# Patient Record
Sex: FEMALE | Race: WHITE | Employment: FULL TIME
[De-identification: names, ages, dates, MRNs, and addresses within clinical notes are randomized per-mention and may not be internally consistent; named-entity substitution may affect disease eponyms.]

---

## 2017-11-11 ENCOUNTER — HEALTH MAINTENANCE LETTER (OUTPATIENT)
Age: 27
End: 2017-11-11

## 2019-09-29 ENCOUNTER — HEALTH MAINTENANCE LETTER (OUTPATIENT)
Age: 29
End: 2019-09-29

## 2021-01-14 ENCOUNTER — HEALTH MAINTENANCE LETTER (OUTPATIENT)
Age: 31
End: 2021-01-14

## 2021-06-02 ENCOUNTER — TRANSFERRED RECORDS (OUTPATIENT)
Dept: HEALTH INFORMATION MANAGEMENT | Facility: CLINIC | Age: 31
End: 2021-06-02

## 2021-06-02 LAB
ABO (EXTERNAL): NORMAL
HEP C HIM: NORMAL
HEPATITIS B SURFACE ANTIGEN (EXTERNAL): NEGATIVE
HIV 1&2 EXT: NORMAL
HIV1+2 AB SERPL QL IA: NONREACTIVE
RH (EXTERNAL): POSITIVE
RUBELLA ANTIBODY IGG (EXTERNAL): NORMAL
TREPONEMA PALLIDUM ANTIBODY (EXTERNAL): NONREACTIVE

## 2021-10-23 ENCOUNTER — HEALTH MAINTENANCE LETTER (OUTPATIENT)
Age: 31
End: 2021-10-23

## 2021-11-08 ENCOUNTER — HOSPITAL ENCOUNTER (OUTPATIENT)
Facility: CLINIC | Age: 31
End: 2021-11-08
Admitting: OBSTETRICS & GYNECOLOGY
Payer: COMMERCIAL

## 2021-12-29 ENCOUNTER — TRANSFERRED RECORDS (OUTPATIENT)
Dept: HEALTH INFORMATION MANAGEMENT | Facility: CLINIC | Age: 31
End: 2021-12-29
Payer: COMMERCIAL

## 2022-01-12 ENCOUNTER — HOSPITAL ENCOUNTER (OUTPATIENT)
Facility: CLINIC | Age: 32
Discharge: HOME | End: 2022-01-12
Attending: OBSTETRICS & GYNECOLOGY | Admitting: OBSTETRICS & GYNECOLOGY
Payer: COMMERCIAL

## 2022-01-12 VITALS — TEMPERATURE: 98.8 F | SYSTOLIC BLOOD PRESSURE: 120 MMHG | DIASTOLIC BLOOD PRESSURE: 70 MMHG

## 2022-01-12 LAB — RUPTURE OF FETAL MEMBRANES BY ROM PLUS: NEGATIVE

## 2022-01-12 PROCEDURE — 84112 EVAL AMNIOTIC FLUID PROTEIN: CPT | Performed by: OBSTETRICS & GYNECOLOGY

## 2022-01-12 PROCEDURE — G0463 HOSPITAL OUTPT CLINIC VISIT: HCPCS | Mod: 25

## 2022-01-12 PROCEDURE — 59025 FETAL NON-STRESS TEST: CPT | Mod: 76

## 2022-01-12 PROCEDURE — G0463 HOSPITAL OUTPT CLINIC VISIT: HCPCS

## 2022-01-12 RX ORDER — PROCHLORPERAZINE 25 MG
25 SUPPOSITORY, RECTAL RECTAL EVERY 12 HOURS PRN
Status: DISCONTINUED | OUTPATIENT
Start: 2022-01-12 | End: 2022-01-12 | Stop reason: HOSPADM

## 2022-01-12 RX ORDER — ONDANSETRON 4 MG/1
4 TABLET, ORALLY DISINTEGRATING ORAL EVERY 6 HOURS PRN
Status: DISCONTINUED | OUTPATIENT
Start: 2022-01-12 | End: 2022-01-12 | Stop reason: HOSPADM

## 2022-01-12 RX ORDER — METOCLOPRAMIDE HYDROCHLORIDE 5 MG/ML
10 INJECTION INTRAMUSCULAR; INTRAVENOUS EVERY 6 HOURS PRN
Status: DISCONTINUED | OUTPATIENT
Start: 2022-01-12 | End: 2022-01-12 | Stop reason: HOSPADM

## 2022-01-12 RX ORDER — ONDANSETRON 2 MG/ML
4 INJECTION INTRAMUSCULAR; INTRAVENOUS EVERY 6 HOURS PRN
Status: DISCONTINUED | OUTPATIENT
Start: 2022-01-12 | End: 2022-01-12 | Stop reason: HOSPADM

## 2022-01-12 RX ORDER — METOCLOPRAMIDE 10 MG/1
10 TABLET ORAL EVERY 6 HOURS PRN
Status: DISCONTINUED | OUTPATIENT
Start: 2022-01-12 | End: 2022-01-12 | Stop reason: HOSPADM

## 2022-01-12 RX ORDER — PROCHLORPERAZINE MALEATE 5 MG
10 TABLET ORAL EVERY 6 HOURS PRN
Status: DISCONTINUED | OUTPATIENT
Start: 2022-01-12 | End: 2022-01-12 | Stop reason: HOSPADM

## 2022-01-12 NOTE — DISCHARGE INSTRUCTIONS
Data: [facility] at 1600.   Reason for maternal/fetal assessment per patient is No chief complaint on file.  . Patient is a . Prenatal record reviewed.      Gestational Age 39w2d. VSS. Cervix: posterior.  Fetal movement present. Patient denies cramping, backache, vaginal discharge, pelvic pressure, UTI symptoms, GI problems, bloody show, vaginal bleeding, edema, headache, visual disturbances, epigastric or URQ pain, abdominal pain, rupture of membranes. Support persons  present.  Action: Verbal consent for EFM. Triage assessment completed. EFM applied for reassuring strip. Uterine assessment no ctx-negative ROM+. Fetal assessment: Presumed adequate fetal oxygenation documented (see flow record). Patient education pamphlets given on fetal movement counts . Patient instructed to report change in fetal movement, vaginal leaking of fluid or bleeding, abdominal pain, or any concerns related to the pregnancy to her nurse/physician.   Response: Dr. Dent informed of NEG ROM+. Plan per provider is keep scheduled induction on 22. Patient verbalized understanding of education and verbalized agreement with plan. Discharged ambulatory at 1735.

## 2022-01-17 ENCOUNTER — TRANSFERRED RECORDS (OUTPATIENT)
Dept: HEALTH INFORMATION MANAGEMENT | Facility: CLINIC | Age: 32
End: 2022-01-17
Payer: COMMERCIAL

## 2022-01-22 ENCOUNTER — HOSPITAL ENCOUNTER (INPATIENT)
Facility: CLINIC | Age: 32
LOS: 2 days | Discharge: HOME | End: 2022-01-24
Attending: OBSTETRICS & GYNECOLOGY | Admitting: OBSTETRICS & GYNECOLOGY
Payer: COMMERCIAL

## 2022-01-22 ENCOUNTER — ANESTHESIA EVENT (OUTPATIENT)
Dept: OBGYN | Facility: CLINIC | Age: 32
End: 2022-01-22
Payer: COMMERCIAL

## 2022-01-22 ENCOUNTER — ANESTHESIA (OUTPATIENT)
Dept: OBGYN | Facility: CLINIC | Age: 32
End: 2022-01-22
Payer: COMMERCIAL

## 2022-01-22 DIAGNOSIS — Z37.9 VACUUM-ASSISTED VAGINAL DELIVERY: ICD-10-CM

## 2022-01-22 PROBLEM — Z36.89 ENCOUNTER FOR TRIAGE IN PREGNANT PATIENT: Status: ACTIVE | Noted: 2022-01-22

## 2022-01-22 LAB
ABO/RH(D): NORMAL
ANTIBODY SCREEN: NEGATIVE
RUPTURE OF FETAL MEMBRANES BY ROM PLUS: NEGATIVE
SARS-COV-2 RNA RESP QL NAA+PROBE: NEGATIVE
SPECIMEN EXPIRATION DATE: NORMAL
T PALLIDUM AB SER QL: NONREACTIVE

## 2022-01-22 PROCEDURE — 250N000011 HC RX IP 250 OP 636: Performed by: ANESTHESIOLOGY

## 2022-01-22 PROCEDURE — 258N000003 HC RX IP 258 OP 636: Performed by: OBSTETRICS & GYNECOLOGY

## 2022-01-22 PROCEDURE — 0DQR0ZZ REPAIR ANAL SPHINCTER, OPEN APPROACH: ICD-10-PCS | Performed by: OBSTETRICS & GYNECOLOGY

## 2022-01-22 PROCEDURE — 722N000001 HC LABOR CARE VAGINAL DELIVERY SINGLE

## 2022-01-22 PROCEDURE — 250N000009 HC RX 250: Performed by: ANESTHESIOLOGY

## 2022-01-22 PROCEDURE — 86901 BLOOD TYPING SEROLOGIC RH(D): CPT | Performed by: OBSTETRICS & GYNECOLOGY

## 2022-01-22 PROCEDURE — 00HU33Z INSERTION OF INFUSION DEVICE INTO SPINAL CANAL, PERCUTANEOUS APPROACH: ICD-10-PCS | Performed by: ANESTHESIOLOGY

## 2022-01-22 PROCEDURE — 258N000003 HC RX IP 258 OP 636: Performed by: ANESTHESIOLOGY

## 2022-01-22 PROCEDURE — 86780 TREPONEMA PALLIDUM: CPT | Performed by: OBSTETRICS & GYNECOLOGY

## 2022-01-22 PROCEDURE — 250N000013 HC RX MED GY IP 250 OP 250 PS 637: Performed by: OBSTETRICS & GYNECOLOGY

## 2022-01-22 PROCEDURE — 250N000011 HC RX IP 250 OP 636: Performed by: OBSTETRICS & GYNECOLOGY

## 2022-01-22 PROCEDURE — 84112 EVAL AMNIOTIC FLUID PROTEIN: CPT | Performed by: OBSTETRICS & GYNECOLOGY

## 2022-01-22 PROCEDURE — 87635 SARS-COV-2 COVID-19 AMP PRB: CPT | Performed by: OBSTETRICS & GYNECOLOGY

## 2022-01-22 PROCEDURE — G0463 HOSPITAL OUTPT CLINIC VISIT: HCPCS | Mod: 25

## 2022-01-22 PROCEDURE — 370N000003 HC ANESTHESIA WARD SERVICE

## 2022-01-22 PROCEDURE — 3E0R3BZ INTRODUCTION OF ANESTHETIC AGENT INTO SPINAL CANAL, PERCUTANEOUS APPROACH: ICD-10-PCS | Performed by: ANESTHESIOLOGY

## 2022-01-22 PROCEDURE — 120N000012 HC R&B POSTPARTUM

## 2022-01-22 PROCEDURE — 59025 FETAL NON-STRESS TEST: CPT

## 2022-01-22 PROCEDURE — 250N000009 HC RX 250: Performed by: OBSTETRICS & GYNECOLOGY

## 2022-01-22 RX ORDER — IBUPROFEN 400 MG/1
800 TABLET, FILM COATED ORAL EVERY 6 HOURS PRN
Status: DISCONTINUED | OUTPATIENT
Start: 2022-01-22 | End: 2022-01-24 | Stop reason: HOSPADM

## 2022-01-22 RX ORDER — CARBOPROST TROMETHAMINE 250 UG/ML
250 INJECTION, SOLUTION INTRAMUSCULAR
Status: DISCONTINUED | OUTPATIENT
Start: 2022-01-22 | End: 2022-01-22 | Stop reason: HOSPADM

## 2022-01-22 RX ORDER — NALOXONE HYDROCHLORIDE 0.4 MG/ML
0.4 INJECTION, SOLUTION INTRAMUSCULAR; INTRAVENOUS; SUBCUTANEOUS
Status: DISCONTINUED | OUTPATIENT
Start: 2022-01-22 | End: 2022-01-22 | Stop reason: HOSPADM

## 2022-01-22 RX ORDER — ONDANSETRON 4 MG/1
4 TABLET, ORALLY DISINTEGRATING ORAL EVERY 6 HOURS PRN
Status: DISCONTINUED | OUTPATIENT
Start: 2022-01-22 | End: 2022-01-22 | Stop reason: HOSPADM

## 2022-01-22 RX ORDER — PROCHLORPERAZINE MALEATE 5 MG
10 TABLET ORAL EVERY 6 HOURS PRN
Status: DISCONTINUED | OUTPATIENT
Start: 2022-01-22 | End: 2022-01-22 | Stop reason: HOSPADM

## 2022-01-22 RX ORDER — ROPIVACAINE HYDROCHLORIDE 2 MG/ML
10 INJECTION, SOLUTION EPIDURAL; INFILTRATION; PERINEURAL ONCE
Status: DISCONTINUED | OUTPATIENT
Start: 2022-01-22 | End: 2022-01-24 | Stop reason: HOSPADM

## 2022-01-22 RX ORDER — ONDANSETRON 2 MG/ML
4 INJECTION INTRAMUSCULAR; INTRAVENOUS EVERY 6 HOURS PRN
Status: DISCONTINUED | OUTPATIENT
Start: 2022-01-22 | End: 2022-01-22 | Stop reason: HOSPADM

## 2022-01-22 RX ORDER — NALOXONE HYDROCHLORIDE 0.4 MG/ML
0.2 INJECTION, SOLUTION INTRAMUSCULAR; INTRAVENOUS; SUBCUTANEOUS
Status: DISCONTINUED | OUTPATIENT
Start: 2022-01-22 | End: 2022-01-24 | Stop reason: HOSPADM

## 2022-01-22 RX ORDER — LIDOCAINE 40 MG/G
CREAM TOPICAL
Status: DISCONTINUED | OUTPATIENT
Start: 2022-01-22 | End: 2022-01-22 | Stop reason: HOSPADM

## 2022-01-22 RX ORDER — MODIFIED LANOLIN
OINTMENT (GRAM) TOPICAL
Status: DISCONTINUED | OUTPATIENT
Start: 2022-01-22 | End: 2022-01-24 | Stop reason: HOSPADM

## 2022-01-22 RX ORDER — OXYTOCIN/0.9 % SODIUM CHLORIDE 30/500 ML
340 PLASTIC BAG, INJECTION (ML) INTRAVENOUS CONTINUOUS PRN
Status: DISCONTINUED | OUTPATIENT
Start: 2022-01-22 | End: 2022-01-22 | Stop reason: HOSPADM

## 2022-01-22 RX ORDER — ROPIVACAINE HYDROCHLORIDE 2 MG/ML
INJECTION, SOLUTION EPIDURAL; INFILTRATION; PERINEURAL
Status: COMPLETED | OUTPATIENT
Start: 2022-01-22 | End: 2022-01-22

## 2022-01-22 RX ORDER — MISOPROSTOL 200 UG/1
800 TABLET ORAL
Status: DISCONTINUED | OUTPATIENT
Start: 2022-01-22 | End: 2022-01-24 | Stop reason: HOSPADM

## 2022-01-22 RX ORDER — NALOXONE HYDROCHLORIDE 0.4 MG/ML
0.2 INJECTION, SOLUTION INTRAMUSCULAR; INTRAVENOUS; SUBCUTANEOUS
Status: DISCONTINUED | OUTPATIENT
Start: 2022-01-22 | End: 2022-01-22 | Stop reason: HOSPADM

## 2022-01-22 RX ORDER — ACETAMINOPHEN 325 MG/1
650 TABLET ORAL EVERY 4 HOURS PRN
Status: DISCONTINUED | OUTPATIENT
Start: 2022-01-22 | End: 2022-01-24 | Stop reason: HOSPADM

## 2022-01-22 RX ORDER — TRANEXAMIC ACID 10 MG/ML
1 INJECTION, SOLUTION INTRAVENOUS EVERY 30 MIN PRN
Status: DISCONTINUED | OUTPATIENT
Start: 2022-01-22 | End: 2022-01-24 | Stop reason: HOSPADM

## 2022-01-22 RX ORDER — OXYTOCIN 10 [USP'U]/ML
10 INJECTION, SOLUTION INTRAMUSCULAR; INTRAVENOUS
Status: DISCONTINUED | OUTPATIENT
Start: 2022-01-22 | End: 2022-01-22 | Stop reason: HOSPADM

## 2022-01-22 RX ORDER — MISOPROSTOL 200 UG/1
400 TABLET ORAL
Status: DISCONTINUED | OUTPATIENT
Start: 2022-01-22 | End: 2022-01-22 | Stop reason: HOSPADM

## 2022-01-22 RX ORDER — VITAMIN A ACETATE, .BETA.-CAROTENE, ASCORBIC ACID, CHOLECALCIFEROL, .ALPHA.-TOCOPHEROL ACETATE, DL-, THIAMINE MONONITRATE, RIBOFLAVIN, NIACINAMIDE, PYRIDOXINE HYDROCHLORIDE, FOLIC ACID, CYANOCOBALAMIN, CALCIUM CARBONATE, FERROUS FUMARATE, ZINC OXIDE, AND CUPRIC OXIDE 2000; 2000; 120; 400; 22; 1.84; 3; 20; 10; 1; 12; 200; 27; 25; 2 [IU]/1; [IU]/1; MG/1; [IU]/1; MG/1; MG/1; MG/1; MG/1; MG/1; MG/1; UG/1; MG/1; MG/1; MG/1; MG/1
1 TABLET ORAL DAILY
COMMUNITY

## 2022-01-22 RX ORDER — NALOXONE HYDROCHLORIDE 0.4 MG/ML
0.4 INJECTION, SOLUTION INTRAMUSCULAR; INTRAVENOUS; SUBCUTANEOUS
Status: DISCONTINUED | OUTPATIENT
Start: 2022-01-22 | End: 2022-01-24 | Stop reason: HOSPADM

## 2022-01-22 RX ORDER — METHYLERGONOVINE MALEATE 0.2 MG/ML
200 INJECTION INTRAVENOUS
Status: DISCONTINUED | OUTPATIENT
Start: 2022-01-22 | End: 2022-01-24 | Stop reason: HOSPADM

## 2022-01-22 RX ORDER — FENTANYL CITRATE 50 UG/ML
50-100 INJECTION, SOLUTION INTRAMUSCULAR; INTRAVENOUS
Status: DISCONTINUED | OUTPATIENT
Start: 2022-01-22 | End: 2022-01-22 | Stop reason: HOSPADM

## 2022-01-22 RX ORDER — EPHEDRINE SULFATE 50 MG/ML
5 INJECTION, SOLUTION INTRAMUSCULAR; INTRAVENOUS; SUBCUTANEOUS
Status: DISCONTINUED | OUTPATIENT
Start: 2022-01-22 | End: 2022-01-24 | Stop reason: HOSPADM

## 2022-01-22 RX ORDER — MISOPROSTOL 200 UG/1
400 TABLET ORAL
Status: DISCONTINUED | OUTPATIENT
Start: 2022-01-22 | End: 2022-01-24 | Stop reason: HOSPADM

## 2022-01-22 RX ORDER — TRANEXAMIC ACID 10 MG/ML
1 INJECTION, SOLUTION INTRAVENOUS EVERY 30 MIN PRN
Status: DISCONTINUED | OUTPATIENT
Start: 2022-01-22 | End: 2022-01-22 | Stop reason: HOSPADM

## 2022-01-22 RX ORDER — SODIUM CHLORIDE, SODIUM LACTATE, POTASSIUM CHLORIDE, CALCIUM CHLORIDE 600; 310; 30; 20 MG/100ML; MG/100ML; MG/100ML; MG/100ML
INJECTION, SOLUTION INTRAVENOUS CONTINUOUS
Status: DISCONTINUED | OUTPATIENT
Start: 2022-01-22 | End: 2022-01-22 | Stop reason: HOSPADM

## 2022-01-22 RX ORDER — METOCLOPRAMIDE 10 MG/1
10 TABLET ORAL EVERY 6 HOURS PRN
Status: DISCONTINUED | OUTPATIENT
Start: 2022-01-22 | End: 2022-01-22 | Stop reason: HOSPADM

## 2022-01-22 RX ORDER — MISOPROSTOL 200 UG/1
800 TABLET ORAL
Status: DISCONTINUED | OUTPATIENT
Start: 2022-01-22 | End: 2022-01-22 | Stop reason: HOSPADM

## 2022-01-22 RX ORDER — OXYTOCIN/0.9 % SODIUM CHLORIDE 30/500 ML
100-340 PLASTIC BAG, INJECTION (ML) INTRAVENOUS CONTINUOUS PRN
Status: DISCONTINUED | OUTPATIENT
Start: 2022-01-22 | End: 2022-01-22

## 2022-01-22 RX ORDER — KETOROLAC TROMETHAMINE 30 MG/ML
30 INJECTION, SOLUTION INTRAMUSCULAR; INTRAVENOUS
Status: DISCONTINUED | OUTPATIENT
Start: 2022-01-22 | End: 2022-01-22

## 2022-01-22 RX ORDER — NALBUPHINE HYDROCHLORIDE 10 MG/ML
2.5-5 INJECTION, SOLUTION INTRAMUSCULAR; INTRAVENOUS; SUBCUTANEOUS EVERY 6 HOURS PRN
Status: DISCONTINUED | OUTPATIENT
Start: 2022-01-22 | End: 2022-01-24 | Stop reason: HOSPADM

## 2022-01-22 RX ORDER — METOCLOPRAMIDE HYDROCHLORIDE 5 MG/ML
10 INJECTION INTRAMUSCULAR; INTRAVENOUS EVERY 6 HOURS PRN
Status: DISCONTINUED | OUTPATIENT
Start: 2022-01-22 | End: 2022-01-22 | Stop reason: HOSPADM

## 2022-01-22 RX ORDER — CARBOPROST TROMETHAMINE 250 UG/ML
250 INJECTION, SOLUTION INTRAMUSCULAR
Status: DISCONTINUED | OUTPATIENT
Start: 2022-01-22 | End: 2022-01-24 | Stop reason: HOSPADM

## 2022-01-22 RX ORDER — IBUPROFEN 600 MG/1
600 TABLET, FILM COATED ORAL
Status: DISCONTINUED | OUTPATIENT
Start: 2022-01-22 | End: 2022-01-22

## 2022-01-22 RX ORDER — DOCUSATE SODIUM 100 MG/1
100 CAPSULE, LIQUID FILLED ORAL 2 TIMES DAILY
Status: DISCONTINUED | OUTPATIENT
Start: 2022-01-22 | End: 2022-01-24 | Stop reason: HOSPADM

## 2022-01-22 RX ORDER — OXYTOCIN 10 [USP'U]/ML
10 INJECTION, SOLUTION INTRAMUSCULAR; INTRAVENOUS
Status: DISCONTINUED | OUTPATIENT
Start: 2022-01-22 | End: 2022-01-22

## 2022-01-22 RX ORDER — OXYTOCIN 10 [USP'U]/ML
10 INJECTION, SOLUTION INTRAMUSCULAR; INTRAVENOUS
Status: DISCONTINUED | OUTPATIENT
Start: 2022-01-22 | End: 2022-01-24 | Stop reason: HOSPADM

## 2022-01-22 RX ORDER — OXYTOCIN/0.9 % SODIUM CHLORIDE 30/500 ML
340 PLASTIC BAG, INJECTION (ML) INTRAVENOUS CONTINUOUS PRN
Status: DISCONTINUED | OUTPATIENT
Start: 2022-01-22 | End: 2022-01-24 | Stop reason: HOSPADM

## 2022-01-22 RX ORDER — METHYLERGONOVINE MALEATE 0.2 MG/ML
200 INJECTION INTRAVENOUS
Status: DISCONTINUED | OUTPATIENT
Start: 2022-01-22 | End: 2022-01-22 | Stop reason: HOSPADM

## 2022-01-22 RX ORDER — HYDROCORTISONE 2.5 %
CREAM (GRAM) TOPICAL 3 TIMES DAILY PRN
Status: DISCONTINUED | OUTPATIENT
Start: 2022-01-22 | End: 2022-01-24 | Stop reason: HOSPADM

## 2022-01-22 RX ORDER — PROCHLORPERAZINE 25 MG
25 SUPPOSITORY, RECTAL RECTAL EVERY 12 HOURS PRN
Status: DISCONTINUED | OUTPATIENT
Start: 2022-01-22 | End: 2022-01-22 | Stop reason: HOSPADM

## 2022-01-22 RX ADMIN — Medication 5 MG: at 05:24

## 2022-01-22 RX ADMIN — DOCUSATE SODIUM 100 MG: 100 CAPSULE, LIQUID FILLED ORAL at 20:42

## 2022-01-22 RX ADMIN — Medication: at 05:11

## 2022-01-22 RX ADMIN — SODIUM CHLORIDE, POTASSIUM CHLORIDE, SODIUM LACTATE AND CALCIUM CHLORIDE 1000 ML: 600; 310; 30; 20 INJECTION, SOLUTION INTRAVENOUS at 04:24

## 2022-01-22 RX ADMIN — ROPIVACAINE HYDROCHLORIDE 10 ML: 2 INJECTION, SOLUTION EPIDURAL; INFILTRATION at 05:00

## 2022-01-22 RX ADMIN — ACETAMINOPHEN 650 MG: 325 TABLET, FILM COATED ORAL at 20:41

## 2022-01-22 RX ADMIN — Medication: at 05:06

## 2022-01-22 RX ADMIN — ACETAMINOPHEN 650 MG: 325 TABLET, FILM COATED ORAL at 14:28

## 2022-01-22 RX ADMIN — Medication 5 MG: at 05:28

## 2022-01-22 RX ADMIN — METHYLERGONOVINE MALEATE 200 MCG: 0.2 INJECTION INTRAVENOUS at 13:30

## 2022-01-22 RX ADMIN — SODIUM CHLORIDE, POTASSIUM CHLORIDE, SODIUM LACTATE AND CALCIUM CHLORIDE 500 ML: 600; 310; 30; 20 INJECTION, SOLUTION INTRAVENOUS at 08:05

## 2022-01-22 RX ADMIN — Medication 340 ML/HR: at 13:11

## 2022-01-22 RX ADMIN — IBUPROFEN 800 MG: 400 TABLET ORAL at 20:42

## 2022-01-22 RX ADMIN — SODIUM CHLORIDE, POTASSIUM CHLORIDE, SODIUM LACTATE AND CALCIUM CHLORIDE: 600; 310; 30; 20 INJECTION, SOLUTION INTRAVENOUS at 05:02

## 2022-01-22 RX ADMIN — Medication 5 MG: at 05:19

## 2022-01-22 RX ADMIN — IBUPROFEN 800 MG: 400 TABLET ORAL at 14:28

## 2022-01-22 ASSESSMENT — ACTIVITIES OF DAILY LIVING (ADL)
ADLS_ACUITY_SCORE: 3

## 2022-01-22 ASSESSMENT — MIFFLIN-ST. JEOR: SCORE: 1314.17

## 2022-01-22 NOTE — PROGRESS NOTES
Ernestina SHANKAR Stephanie arrived via ambulatory at 0330 and was admitted to 1218 for labor. Oriented to room, placed on monitor, call light within reach and instructed on use. Dr Sullivan notified of patient arrival and condition.

## 2022-01-22 NOTE — PROVIDER NOTIFICATION
Dr Vu called unit to check on FHR. Updated on moderate variability with accels. No new orders received. Will updated Dr Vu as needed.

## 2022-01-22 NOTE — PROVIDER NOTIFICATION
01/22/22 0800   Provider Notification   Provider Name/Title Dr Vu   Method of Notification Phone   Request Evaluate - Remote   Notification Reason Decels;Membrane Status;Labor Status;Status Update;SVE    Dr Vu notified via phone of prolonged and variable decels. FHR back to baseline with moderate variability after PD. Also notified Dr Vu of possible forebag noted with SVE. No new orders received. Will update Dr Vu as needed.

## 2022-01-22 NOTE — PROVIDER NOTIFICATION
01/22/22 1100   Provider Notification   Provider Name/Title Dr Vu   Method of Notification At Bedside   Notification Reason SVE

## 2022-01-22 NOTE — ANESTHESIA PREPROCEDURE EVALUATION
Anesthesia Pre-Procedure Evaluation    Patient: Ernestina Brown   MRN: [id number] : 1990        Preoperative Diagnosis: * No surgery found *    Procedure :           History reviewed. No pertinent past medical history.   Past Surgical History:   Procedure Laterality Date     ENDOSCOPY        No Known Allergies   Social History     Tobacco Use     Smoking status: Never Smoker     Smokeless tobacco: Never Used   Substance Use Topics     Alcohol use: Not Currently     Comment: not during pregnancy      Wt Readings from Last 1 Encounters:   22 63.5 kg (140 lb)        Anesthesia Evaluation            ROS/MED HX  ENT/Pulmonary:    (-) asthma   Neurologic:  - neg neurologic ROS     Cardiovascular:    (-) PIH   METS/Exercise Tolerance:     Hematologic:     (+) no anticoagulation therapy, no coagulopathy,     Musculoskeletal:       GI/Hepatic:     (+) GERD,     Renal/Genitourinary:       Endo:       Psychiatric/Substance Use:       Infectious Disease:       Malignancy:       Other:            Physical Exam    Airway        Mallampati: II   TM distance: > 3 FB   Neck ROM: full     Respiratory Devices and Support         Dental  no notable dental history         Cardiovascular   cardiovascular exam normal          Pulmonary   pulmonary exam normal                OUTSIDE LABS:  CBC:   Lab Results   Component Value Date    WBC 6.1 2015    WBC 5.1 2014    HGB 14.6 2015    HGB 14.7 2014    HCT 41.9 2015    HCT 41.8 2014     2015     2014     BMP:   Lab Results   Component Value Date     2014     2011    POTASSIUM 4.4 2014    POTASSIUM 3.9 2011    CHLORIDE 107 2014    CHLORIDE 103 2011    CO2 32 2014    CO2 27 2011    BUN 7 2014    BUN 7 2011    CR 0.73 2014    CR 0.81 2011    GLC 80 2014    GLC 74 2011     COAGS: No results found for: PTT, INR, FIBR  POC: No  results found for: BGM, HCG, HCGS  HEPATIC:   Lab Results   Component Value Date    ALBUMIN 3.9 11/26/2014    PROTTOTAL 6.9 11/26/2014    ALT 18 11/26/2014    AST 22 11/26/2014    ALKPHOS 112 11/26/2014    BILITOTAL 0.9 11/26/2014     OTHER:   Lab Results   Component Value Date    ANUP 8.6 11/26/2014    TSH 1.57 09/11/2015       Anesthesia Plan    ASA Status:  2      Anesthesia Type: Epidural.              Consents    Anesthesia Plan(s) and associated risks, benefits, and realistic alternatives discussed. Questions answered and patient/representative(s) expressed understanding.    - Discussed:     - Discussed with:  Patient         Postoperative Care            Comments:    Other Comments: Orders to manage the epidural infusion have been entered, and through coordination with the nurse, we will continute to manage and monitor the patient's labor epidural.  We will continuously be available to adjust as needed thruout the entire L&D process.             Vic Evans MD

## 2022-01-22 NOTE — PROVIDER NOTIFICATION
01/22/22 0653   Provider Notification   Provider Name/Title Dr Sullivan   Method of Notification Electronic Page   Request Evaluate - Remote   Notification Reason SVE;Status Update;Decels;Fetal Baseline Change   Dr Sullivan updated with membrane status, SVE, post epidural decels and current baseline of 155, moderate variability with accels, and contraction pattern.   Plan to hold off on pitocin for a while given decels post epidural.

## 2022-01-22 NOTE — PLAN OF CARE
Pt assisted up to BR with SBA x2. Pt able to void. Pt transferred via wheelchair to postpartum unit. Pt tolerated tx well. Fundus found to be to the right of the umbilicus and up 1 upon arrival to postpartum. Pt assisted to BR to void. Pt able to void again. Report given to VONNIE Pinto RN.

## 2022-01-22 NOTE — L&D DELIVERY NOTE
"Vacuum Assisted Vaginal Delivery Procedure Note     Delivery provider: Aziza Vu MD; [facility]     Delivery date/time: 2022     Preop Dx:   1. IUP at 40w5d  2. Active labor  3. Category 2 FHT - recurrent variable decelerations, with prolonged recovery    Postop Dx:   Same as above plus  4. Uterine atony    Procedure: vacuum assisted vaginal delivery     Anesthesia: epidural     EBL: 745 mL     Specimens: cord blood, placenta, cord gases     Findings: VFI \"Manuel\", OA presentation, 3VC, no nuchal cord, Apgars 8/9, partial 3rd degree perineal laceration - fibrous sheath barely frayed and reinforced with stitch x2. Received methergine.     Results for JUDITH, FEMALE-AISHA (MRN [id number]) as of 2022 14:05   Ref. Range 2022 13:06   Ph Cord Blood Venous Latest Ref Range: 7.21 - 7.45  7.40   PCO2 Cord Venous Latest Ref Range: 27 - 57 mm Hg 41   PO2 Cord Venous Latest Ref Range: 21 - 37 mm Hg 19 (L)   Bicarbonate Cord Venous Latest Ref Range: 16 - 24 mmol/L 25 (H)   Base Excess/Deficit (+/-) Latest Ref Range: -8.1 - 1.9 mmol/L 0.3       Labor Course: Aisha Brown is a 31 year old  at 40w5d for Active labor. She had SROM in triage at about 3am. She declined early genetics, but knows sex is female. She had normal 1 hr GCT. Her anatomy US was normal.  Her pregnancy has been complicated by COVID in early pregnancy. A 32 week US showed appropriate EGA, however had mild polyhydramnios. Recheck at 36 weeks showed normal fluid level.     She received an epidural for pain management. She progressed to complete and maternal expulsive efforts were started.     Fetal tracing category 1-2 with 2x prolonged decels throughout labor. Once with the epidural and one spontaneously that resolved with uterine resuscitation. Category 2 during pushing efforts to delivery with variable declarations which resolved to normal baseline and moderate variability between contractions.      Delivery details:  At " bedside. Patient complete and pushing. With 2 hours of maternal expulsive efforts, fetal head was , however unable to determine fetal baseline. Once FHT were located, noted to be 87 bpm. Discussed vacuum assisted delivery to expedite the delivery. Discussed R/B/A including maternal tissue damage, and fetal laceration and/or bleeding. Bladder was just emptied with straight catheter for 200 mL. EFW 3400g, good maternal pain management, adequate pelvis. Fetal position MELISSA, and station at +4. The mity vac mushroom was applied to the fetal flexion point. With the next contraction, pressure was applied to 50 cm Hg in the green zone. With the next contraction, gentle traction was applied and  the infant's head delivered with one pull x10 seconds. Vacuum was removed. Examined for nuchal cord and none noted. Shoulders followed without force or delay. Delivered VFI to mother's abdomen. Cord clamped and cut after 60 seconds; 3VC. Cord gases and cord blood collected and sent.  Placenta delivered via gentle traction and fundal massage. Uterus explored and cleared of all clot and debris. Fundal massage expelled large clots, and requested methergine for patient. Perineum examined and partial 3rd degree laceration noted - only the capsule appeared slightly frayed. It was reinforced with two separate stitches with 2-0 vicryl. The remained of the laceration was repaired in the standard fashion using 3-0 vicryl. Remainder of exam showed Bilateral periurethral lacerations were noted, though shallow and hemostatic so no repair necessary.  mL. Apgars 8/9 at 1 and 5 minutes respectively. Vaginal counts correct. Fundus firm at -2 with scant/normal flow after.    Sponge and needle count were correct.      stable with mother admitted to [facility]. Mother stable in delivery room.     Rosalee Vu MD  [phone]  22 1:47 PM

## 2022-01-22 NOTE — PLAN OF CARE
"Patient reports \"I think my water is breaking\" upon this RN's entry to the room.  Presence of clear/slight-blood tinged fluid noted.  "

## 2022-01-22 NOTE — PLAN OF CARE
Pt arrives ambulatory to MAC 3 for labor assessment.  Patient states she has been cramping all day, and began having contractions that became stronger and approximately 5 minutes apart 2.5 hours ago.  She denies leaking of fluid or bleeding, reports + fetal movement.  Consent obtained for external fetal and uterine monitoring - external monitors applied.  Assessment and admission completed.   present and supportive.

## 2022-01-22 NOTE — PROGRESS NOTES
In-house OB L&D Progress Note    Requested to come to room for prolonged deceleration.  Upon arrival intrauterine resuscitation measures underway.  FHR returning to baseline with moderate variability.  Patient being turned from hands and knees to R lateral position and baby tolerating.  SVE per RN 8-9cm, did not report any concern for palpating cord.  No tachysystole, fetus tolerated contraction while in room  BP appropriate, no hypotension.  No further intervention needed at this time, will remain nearby PRN, primary OB/GYN will be en route.    Electronically signed by:  Catherine Henao  [facility] Office  Pager: [phone]  January 22, 2022

## 2022-01-22 NOTE — PLAN OF CARE
Pt admitted at 3cm; got epidural. Post epidural had 2 3min long prolongs with lates. SBP in 90s. 5mg Ephedrine given x3. FHR tachycardic post ephedrine, not settled into 155 baseline with moderate variability with accels, occasional variable. Pt comfortable with epidural; using peanut ball on lateral sides.

## 2022-01-22 NOTE — PLAN OF CARE
Transferred from Labor and delivery at 1600 by wheelchair with baby in arms. Oriented to room and unit. Fundus firm up 1 and deviated- to bathroom to void large amount. Vitals stable. Denies pain. Will continue to monitor.

## 2022-01-22 NOTE — PROGRESS NOTES
Labor Progress note:     S: Ernestina is comfortable with epidural in place. Feeling more pressure with contractions.     O:   Vitals:    22 0800 22 0830 22 0900 22 0930   BP: 108/68 116/68 (!) 143/69 100/57   Pulse:       Resp: 16      Temp:       TempSrc:       SpO2: 99% 100% 99% 99%   Weight:       Height:         A/Ox3, NAD  Gravid    CVX: 9/90/+1-2    FHT: 140/moderate/+accel/early decel and prolonged decel that resolved with repositioning. Recovered to normal baseline with moderate variability.     A/P: Ernestina Brown is a 31 year old  female at 40w5d in active labor.     1. Labor: spontaneous labor. Now 9 cm, continue to monitor.   2. IUP: category 2 FHT -- recovered to moderate variability. Continue to monitor.   3. Anticipate     Rosalee Vu MD  [phone]  22 10:20 AM

## 2022-01-22 NOTE — H&P
OB Admission History and Physical    HPI:  Patient is a 31 year old female who presents to Labor and Delivery at 40w5d for Active labor. She had SROM in triage at about 3am. She declined early genetics, but knows sex is female. She had normal 1 hr GCT. Her anatomy US was normal.  Her pregnancy has been complicated by COVID in early pregnancy. A 32 week US showed appropriate EGA, however had mild polyhydramnios. Recheck at 36 weeks showed normal fluid level.     Patient Active Problem List   Diagnosis     iamJOINT DERANGMENT NEC-SHLDER     Chronic constipation     IBS (irritable bowel syndrome)     Cervicalgia     Gluten intolerance     Lactose intolerance     CARDIOVASCULAR SCREENING; LDL GOAL LESS THAN 130     Labor complication     Encounter for triage in pregnant patient     Indication for care in labor or delivery       Full Code      Prenatal Lab Results:     Results for AISHA WONG (MRN [id number]) as of 2022 10:13   Ref. Range 2021 00:00   ABO (External) Unknown B   Rh (External) Unknown POSITIVE   Hepatitis B Surface Antigen (External) Latest Ref Range: Nonreactive  Negative   HIV 1&2 Antibody (External) Latest Ref Range: Nonreactive  Nonreactive   Treponema Palldum Antibody (RPR) (External) Latest Ref Range: Nonreactive  Nonreactive   Rubella Antibody IgG (External) Latest Ref Range: Nonreactive  Immune         GBS:  Negative      OB History    Para Term  AB Living   1 0 0 0 0 0   SAB IAB Ectopic Multiple Live Births   0 0 0 0 0      # Outcome Date GA Lbr Louis/2nd Weight Sex Delivery Anes PTL Lv   1 Current                Past Surgical History:   Procedure Laterality Date     ENDOSCOPY         History reviewed. No pertinent past medical history.    Social History     Socioeconomic History     Marital status:      Spouse name: None     Number of children: None     Years of education: None     Highest education level: None   Occupational History     None   Tobacco Use      "Smoking status: Never Smoker     Smokeless tobacco: Never Used   Substance and Sexual Activity     Alcohol use: Not Currently     Comment: not during pregnancy     Drug use: Never     Sexual activity: Yes     Partners: Male   Other Topics Concern      Service No     Blood Transfusions No     Caffeine Concern No     Occupational Exposure No     Hobby Hazards No     Sleep Concern No     Stress Concern No     Weight Concern No     Special Diet Yes     Comment: no dairy      Back Care No     Exercise Yes     Bike Helmet Not Asked     Seat Belt Yes     Self-Exams Yes   Social History Narrative    ** Merged History Encounter **          Social Determinants of Health     Financial Resource Strain: Not on file   Food Insecurity: Not on file   Transportation Needs: Not on file   Physical Activity: Not on file   Stress: Not on file   Social Connections: Not on file   Intimate Partner Violence: Not on file   Housing Stability: Not on file       Medications:    Medications Prior to Admission   Medication Sig Dispense Refill Last Dose     Prenatal Vit-Fe Fumarate-FA (PNV PRENATAL PLUS MULTIVITAMIN) 27-1 MG TABS per tablet Take 1 tablet by mouth daily   1/21/2022 at Unknown time     Probiotic Product (PROBIOTIC PO)    Unknown at Unknown time       Allergies:    Patient has no known allergies.    Review of Systems:  A comprehensive review of systems was negative except for those noted in HPI    Physical Examination:  Current Inpatient Vital Signs: Blood pressure 100/57, pulse 102, temperature 99  F (37.2  C), temperature source Temporal, resp. rate 16, height 1.6 m (5' 3\"), weight 63.5 kg (140 lb), SpO2 99 %, not currently breastfeeding.    General: NAD, A/Ox3  Lungs:  Normal effort, no work of breathing  Heart:  Normal rate  Abdomen:  gravid. NTTP  Estimated Fetal Weight: 3400g  Extremities:  Normal, no edema  Skin:  normal    Cervical Exam:  3/90/-1 on admission      BPM: 135  Variability:  moderate.  Accelerations:  " present.  Decelerations:  absent.  Contractions:  every 4-6 min.  Overall assessment:  Category 1      Results for AISHA WONG (MRN [id number]) as of 2022 10:13   Ref. Range 2022 03:47   SARS CoV2 PCR Latest Ref Range: Negative  Negative       Assessment/Plan:  , intrauterine pregnancy at 40w5d with ELLEN of 2022    1. Active labor/SROM   Admit to Labor and Delivery for labor management   Pain management per pt request   Anticipate .   2. GBS negative.  Prophylaxis: not indicated  3. COVID negative screening  4. Rubella immune  5. Blood type pos; Rhogam not indicated      Rosalee Vu MD  [phone]  22 10:08 AM

## 2022-01-22 NOTE — PLAN OF CARE
PD began at 0857 during position change. SVE found to be 8/95/0 station with a forebag. Pt repositioned from side to side, then hands and knees. FHR back to baseline of 135 at 0905. IV fluid bolus given. Pt able to position to right lateral position. In house OB called to bedside to evaluate. Dr Vu notified and en route to hospital.

## 2022-01-22 NOTE — ANESTHESIA PROCEDURE NOTES
Epidural catheter Procedure Note    Pre-Procedure   Staff -        Anesthesiologist:  Vic Evans MD       Performed By: anesthesiologist       Location: OB       Pre-Anesthestic Checklist: patient identified, IV checked, risks and benefits discussed, informed consent, monitors and equipment checked, pre-op evaluation and at physician/surgeon's request  Timeout:       Correct Patient: Yes        Correct Procedure: Yes        Correct Site: Yes        Correct Position: Yes   Procedure Documentation  Procedure: epidural catheter       Patient Position: sitting       Patient Prep/Sterile Barriers: sterile gloves, mask, patient draped       Skin prep: Betadine and DuraPrep      Local skin infiltrated with mL of 1% lidocaine.        Insertion Site: L2-3. (midline approach).       Technique: LORT saline        SULTANA at 5 cm.       Needle Type: Fatwirey needle       Needle Gauge: 17.        Needle Length (Inches): 5        Catheter: 19 G.         Catheter threaded easily.         5 cm epidural space.         Threaded 10 cm at skin.        # of attempts: 1 and  # of redirects: : 0.    Assessment/Narrative         Paresthesias: No.      Test dose of 3 mL lidocaine 1.5% w/ 1:200,000 epinephrine at.         Test dose negative, 3 minutes after injection, for signs of intravascular, subdural, or intrathecal injection.       Insertion/Infusion Method: LORT saline       No aspiration negative for Heme or CSF via Epidural Catheter.    Medication(s) Administered   0.2% Ropivacaine (Epidural), 10 mL  Medication Administration Time: 1/22/2022 5:00 AM     Comments:  Pt tolerated well.    Taped sterile and secure.   FHTs stable post-procedure.   No complications.

## 2022-01-22 NOTE — PROVIDER NOTIFICATION
01/22/22 1000   Provider Notification   Provider Name/Title Dr Vu   Method of Notification At Bedside   Notification Reason Status Update

## 2022-01-22 NOTE — PROVIDER NOTIFICATION
Dr Vu notified via phone of pt having another prolonged decel. Asked Dr Vu to come evaluate pt. Dr Vu en route to hospital.

## 2022-01-23 LAB — HGB BLD-MCNC: 11 G/DL (ref 11.7–15.7)

## 2022-01-23 PROCEDURE — 36415 COLL VENOUS BLD VENIPUNCTURE: CPT | Performed by: OBSTETRICS & GYNECOLOGY

## 2022-01-23 PROCEDURE — 85018 HEMOGLOBIN: CPT | Performed by: OBSTETRICS & GYNECOLOGY

## 2022-01-23 PROCEDURE — 250N000013 HC RX MED GY IP 250 OP 250 PS 637: Performed by: OBSTETRICS & GYNECOLOGY

## 2022-01-23 PROCEDURE — 120N000012 HC R&B POSTPARTUM

## 2022-01-23 RX ADMIN — DOCUSATE SODIUM 100 MG: 100 CAPSULE, LIQUID FILLED ORAL at 08:41

## 2022-01-23 RX ADMIN — ACETAMINOPHEN 650 MG: 325 TABLET, FILM COATED ORAL at 08:41

## 2022-01-23 RX ADMIN — IBUPROFEN 800 MG: 400 TABLET ORAL at 08:41

## 2022-01-23 RX ADMIN — IBUPROFEN 800 MG: 400 TABLET ORAL at 14:53

## 2022-01-23 RX ADMIN — IBUPROFEN 800 MG: 400 TABLET ORAL at 02:30

## 2022-01-23 RX ADMIN — IBUPROFEN 800 MG: 400 TABLET ORAL at 21:14

## 2022-01-23 RX ADMIN — ACETAMINOPHEN 650 MG: 325 TABLET, FILM COATED ORAL at 02:30

## 2022-01-23 RX ADMIN — ACETAMINOPHEN 650 MG: 325 TABLET, FILM COATED ORAL at 14:53

## 2022-01-23 RX ADMIN — DOCUSATE SODIUM 100 MG: 100 CAPSULE, LIQUID FILLED ORAL at 21:14

## 2022-01-23 RX ADMIN — ACETAMINOPHEN 650 MG: 325 TABLET, FILM COATED ORAL at 21:14

## 2022-01-23 ASSESSMENT — ACTIVITIES OF DAILY LIVING (ADL)
ADLS_ACUITY_SCORE: 3

## 2022-01-23 NOTE — LACTATION NOTE
Routine Lactation visit with Ernestina, significant other Maciel & baby girl Manuel. Ernestina reports feeding is going well so far. Her nipples are a little tender, using nipple cream after feedings. At time of visit, baby latched deeply at right breast, lips flanged widely, nutritive suck pattern observed. Reviewed signs of a good vs poor latch, how to check and adjust latch as needed.    Ernestina asked for LC advice regarding pacifier use. Reviewed AAP recommendations to avoid pacifiers until breastfeeding is well established. Also discussed recommendations regarding unlimited, frequent early feedings when establishing breastfeeding and good breastmilk supply. However, did stress importance of deep, comfortable latches and utilizing nursing support while in hospital to ensure baby is latched correctly. Ernestina very appreciative of information. Reviewed milk supply and engorgement. General questions answered regarding pumping, when it's helpful and necessary, general recommendation to wait to start pumping until breastfeeding is well established. Discussed introducing a bottle and recommendation to wait for bottle introduction for 3-4 weeks unless baby needs to supplement for medical reasons. Breastfeeding section reviewed in Your Guide to Postpartum & [facility] Care. Discussed typical  feeding patterns, cluster feeding, and ways to wake a sleepy baby for feedings.    Feeding plan: Recommend unlimited, frequent breast feedings: At least 8 - 12 times every 24 hours. Avoid pacifiers and supplementation with formula unless medically indicated. Encouraged use of feeding log and to record feedings, and void/stool patterns. Ernestina has a pump for home use.  Encouraged to call with needs, will revisit as needed. Ernestina & Maciel very appreciative of visit.    Leta Mckeon, RN-C, IBCLC, MNN, PHN, BSN

## 2022-01-23 NOTE — PLAN OF CARE
The pt reports pain adequately-controlled with Tylenol/Ibuprofen and she ambulating independently in the room.  She's working on breastfeeding, she required a partial staff assist for correct positioning, and on demand feedings reviewed.  Discharge in the AM expected

## 2022-01-23 NOTE — PLAN OF CARE
Vitals stable. Feels well. Oral pain medications working well. Breast feeding without difficulty. Will continue to monitor.

## 2022-01-23 NOTE — PLAN OF CARE
Vital signs stable, afebrile, voiding well, ice and tucks to perineum prn, FFU/1 scant rubra lochia, able to ambulate without dizziness, able to rest, pain well controlled with medications, rates her pain 2/10, breast feeding  skin-to-skin on demand.  is here and is supportive.

## 2022-01-23 NOTE — PROGRESS NOTES
OB Vaginal Delivery Progress Note    Patient name: Ernestina Brown  : 1990  Admit date: 2022  MRN: [id number]  Acct: [id number]      Assessment/Plan:  Ernestina Brown is a  who is PPD#1 from VAVD (NRFWB), followed by partial 3rd deg lac and PPH (745mL EBL and received Methergine).    - HD stable, pain controlled  - Partial 3rd deg lac: cont stool softeners, pt doing well  - Acute blood loss anemia (from PPH): Hgb this AM from 11.0. Pt asx. Monitor, no need for treatment.  - Blood type: B POS, no need for Rhogam  - VFI (Manuel); breast feeding  - Cont routine PP care. Anticipate d/c home tomorrow       Subjective:  The patient did well overnight. There were no acute issues. Her pain is well controlled. She notes appropriate lochia. She is tolerating a regular diet well without nausea or vomiting. She has ambulated. She is voiding without difficulty. She denies dizziness, lightheadedness, headache, vision changes, chest pain, shortness of breath, RUQ pain, calf pain, or other complaints on ROS.    Objective:  Vitals:  Temp:  [98  F (36.7  C)-100.1  F (37.8  C)] 98  F (36.7  C)  Pulse:  [77-87] 78  Resp:  [16] 16  BP: (102-120)/(65-81) 110/68  SpO2:  [100 %] 100 %    Exam:  General: no acute distress  Cardiovascular: pulses intact, no peripheral edema  Pulmonary: no respiratory difficulty, normal non-labored breathing  Abdomen: soft, appropriatly tender to palpation, non-distended  Uterus: fundus firm at U-1  Extremities: BL lower extremities non-tender, no palpable cords  Psych: mood appropriate      MD [facility]  2022, 11:43 AM

## 2022-01-23 NOTE — PLAN OF CARE
Ernestina's VS, fundus and lochia WNL. Pain well controlled with tylenol and ibuprofen. Ambulating, voiding, august care and breastfeeding independently.

## 2022-01-24 VITALS
RESPIRATION RATE: 16 BRPM | HEART RATE: 70 BPM | BODY MASS INDEX: 24.8 KG/M2 | TEMPERATURE: 99.1 F | HEIGHT: 63 IN | DIASTOLIC BLOOD PRESSURE: 73 MMHG | OXYGEN SATURATION: 100 % | SYSTOLIC BLOOD PRESSURE: 111 MMHG | WEIGHT: 140 LBS

## 2022-01-24 PROBLEM — Z37.9 VACUUM-ASSISTED VAGINAL DELIVERY: Status: ACTIVE | Noted: 2022-01-24

## 2022-01-24 PROCEDURE — 250N000013 HC RX MED GY IP 250 OP 250 PS 637: Performed by: OBSTETRICS & GYNECOLOGY

## 2022-01-24 RX ORDER — IBUPROFEN 800 MG/1
800 TABLET, FILM COATED ORAL EVERY 8 HOURS PRN
Qty: 30 TABLET | Refills: 0 | Status: SHIPPED | OUTPATIENT
Start: 2022-01-24

## 2022-01-24 RX ORDER — ACETAMINOPHEN 325 MG/1
650 TABLET ORAL EVERY 4 HOURS PRN
Qty: 60 TABLET | Refills: 0 | Status: SHIPPED | OUTPATIENT
Start: 2022-01-24

## 2022-01-24 RX ORDER — SENNA AND DOCUSATE SODIUM 50; 8.6 MG/1; MG/1
1 TABLET, FILM COATED ORAL AT BEDTIME
Qty: 60 TABLET | Refills: 0 | Status: SHIPPED | OUTPATIENT
Start: 2022-01-24

## 2022-01-24 RX ADMIN — DOCUSATE SODIUM 100 MG: 100 CAPSULE, LIQUID FILLED ORAL at 08:15

## 2022-01-24 RX ADMIN — ACETAMINOPHEN 650 MG: 325 TABLET, FILM COATED ORAL at 08:15

## 2022-01-24 RX ADMIN — IBUPROFEN 800 MG: 400 TABLET ORAL at 08:15

## 2022-01-24 ASSESSMENT — ACTIVITIES OF DAILY LIVING (ADL)
ADLS_ACUITY_SCORE: 3

## 2022-01-24 NOTE — PROGRESS NOTES
OB Vaginal Delivery Progress Note    Patient name: Ernestina Brown  : 1990  Admit date: 2022  MRN: [id number]  Acct: [id number]      Assessment/Plan:  Ernestina Brown is a  who is PPD#2 from VAVD.    - HD stable. Pain controlled.   - Partial third degree lac: continue stool softeners.   - Blood type: B POS, no need for Rhogam  - VFI; breast feeding  - Cont routine PP care. Anticipate d/c home today discharge instructions and precautions reviewed.    Subjective:  The patient did well overnight. There were no acute issues. Her pain is well controlled. She notes appropriate lochia. She is tolerating a regular diet well without nausea or vomiting. She has ambulated. She is voiding without difficulty. She denies dizziness, lightheadedness, headache, vision changes, chest pain, shortness of breath, RUQ pain, calf pain, or other complaints on ROS.    Objective:  Vitals:  Temp:  [97.3  F (36.3  C)-99.1  F (37.3  C)] 99.1  F (37.3  C)  Pulse:  [70-83] 70  Resp:  [16] 16  BP: (101-111)/(64-73) 111/73    Exam:  General: no acute distress  Cardiovascular: pulses intact, no peripheral edema  Pulmonary: no respiratory difficulty, normal non-labored breathing  Abdomen: soft, appropriatly tender to palpation, non-distended  Uterus: fundus firm at U-2  Extremities: BL lower extremities non-tender, no palpable cords  Psych: mood appropriate      Rosalee Vu MD  2022, 8:37 AM

## 2022-01-24 NOTE — PLAN OF CARE
Vital signs stable. Postpartum assessment WDL. Pain controlled patient denies need for medications in the night. Patient voiding without difficulty. Ambulating well. Denies dizziness. Breastfeeding well independently. Patient and infant bonding well. Will continue with current plan of care.

## 2022-01-24 NOTE — PLAN OF CARE
Itals stable. Feels well. Oral pain medications working well. Breast feeding without difficulty- milk coming in. Ready for discharge home with baby.

## 2022-01-24 NOTE — PLAN OF CARE
Vital signs stable. Pain controlled with tylenol and ibuprofen. Breastfeeding independently. Patient and infant bonding well. Will continue with current plan of care.

## 2022-10-10 ENCOUNTER — HEALTH MAINTENANCE LETTER (OUTPATIENT)
Age: 32
End: 2022-10-10

## 2023-02-01 LAB
ABO (EXTERNAL): NORMAL
HEPATITIS B SURFACE ANTIGEN (EXTERNAL): NEGATIVE
HIV1+2 AB SERPL QL IA: NONREACTIVE
RH (EXTERNAL): POSITIVE
RUBELLA ANTIBODY IGG (EXTERNAL): NORMAL
TREPONEMA PALLIDUM ANTIBODY (EXTERNAL): NONREACTIVE

## 2023-09-24 ENCOUNTER — ANESTHESIA EVENT (OUTPATIENT)
Dept: OBGYN | Facility: CLINIC | Age: 33
End: 2023-09-24
Payer: COMMERCIAL

## 2023-09-24 ENCOUNTER — HOSPITAL ENCOUNTER (INPATIENT)
Facility: CLINIC | Age: 33
LOS: 2 days | Discharge: HOME | End: 2023-09-26
Attending: OBSTETRICS & GYNECOLOGY | Admitting: OBSTETRICS & GYNECOLOGY
Payer: COMMERCIAL

## 2023-09-24 ENCOUNTER — ANESTHESIA (OUTPATIENT)
Dept: OBGYN | Facility: CLINIC | Age: 33
End: 2023-09-24
Payer: COMMERCIAL

## 2023-09-24 LAB
ABO/RH(D): NORMAL
ANTIBODY SCREEN: NEGATIVE
HGB BLD-MCNC: 14.8 G/DL (ref 11.7–15.7)
HOLD SPECIMEN: NORMAL
HOLD SPECIMEN: NORMAL
SPECIMEN EXPIRATION DATE: NORMAL

## 2023-09-24 PROCEDURE — 00HU33Z INSERTION OF INFUSION DEVICE INTO SPINAL CANAL, PERCUTANEOUS APPROACH: ICD-10-PCS | Performed by: SURGERY

## 2023-09-24 PROCEDURE — 120N000001 HC R&B MED SURG/OB

## 2023-09-24 PROCEDURE — 250N000011 HC RX IP 250 OP 636: Performed by: SURGERY

## 2023-09-24 PROCEDURE — 85018 HEMOGLOBIN: CPT | Performed by: OBSTETRICS & GYNECOLOGY

## 2023-09-24 PROCEDURE — 0HQ9XZZ REPAIR PERINEUM SKIN, EXTERNAL APPROACH: ICD-10-PCS | Performed by: OBSTETRICS & GYNECOLOGY

## 2023-09-24 PROCEDURE — 86780 TREPONEMA PALLIDUM: CPT | Performed by: OBSTETRICS & GYNECOLOGY

## 2023-09-24 PROCEDURE — 3E0R3BZ INTRODUCTION OF ANESTHETIC AGENT INTO SPINAL CANAL, PERCUTANEOUS APPROACH: ICD-10-PCS | Performed by: SURGERY

## 2023-09-24 PROCEDURE — 250N000009 HC RX 250: Performed by: OBSTETRICS & GYNECOLOGY

## 2023-09-24 PROCEDURE — 36415 COLL VENOUS BLD VENIPUNCTURE: CPT | Performed by: OBSTETRICS & GYNECOLOGY

## 2023-09-24 PROCEDURE — 722N000001 HC LABOR CARE VAGINAL DELIVERY SINGLE

## 2023-09-24 PROCEDURE — G0463 HOSPITAL OUTPT CLINIC VISIT: HCPCS

## 2023-09-24 PROCEDURE — 250N000009 HC RX 250: Performed by: SURGERY

## 2023-09-24 PROCEDURE — 370N000003 HC ANESTHESIA WARD SERVICE: Performed by: SURGERY

## 2023-09-24 PROCEDURE — 86901 BLOOD TYPING SEROLOGIC RH(D): CPT | Performed by: OBSTETRICS & GYNECOLOGY

## 2023-09-24 PROCEDURE — 86850 RBC ANTIBODY SCREEN: CPT | Performed by: OBSTETRICS & GYNECOLOGY

## 2023-09-24 PROCEDURE — 10907ZC DRAINAGE OF AMNIOTIC FLUID, THERAPEUTIC FROM PRODUCTS OF CONCEPTION, VIA NATURAL OR ARTIFICIAL OPENING: ICD-10-PCS | Performed by: OBSTETRICS & GYNECOLOGY

## 2023-09-24 PROCEDURE — 250N000011 HC RX IP 250 OP 636: Mod: JZ | Performed by: SURGERY

## 2023-09-24 PROCEDURE — 258N000003 HC RX IP 258 OP 636: Performed by: OBSTETRICS & GYNECOLOGY

## 2023-09-24 RX ORDER — HYDROCORTISONE 25 MG/G
CREAM TOPICAL 3 TIMES DAILY PRN
Status: DISCONTINUED | OUTPATIENT
Start: 2023-09-24 | End: 2023-09-26 | Stop reason: HOSPADM

## 2023-09-24 RX ORDER — NALOXONE HYDROCHLORIDE 0.4 MG/ML
0.2 INJECTION, SOLUTION INTRAMUSCULAR; INTRAVENOUS; SUBCUTANEOUS
Status: DISCONTINUED | OUTPATIENT
Start: 2023-09-24 | End: 2023-09-24 | Stop reason: HOSPADM

## 2023-09-24 RX ORDER — NALOXONE HYDROCHLORIDE 0.4 MG/ML
0.4 INJECTION, SOLUTION INTRAMUSCULAR; INTRAVENOUS; SUBCUTANEOUS
Status: DISCONTINUED | OUTPATIENT
Start: 2023-09-24 | End: 2023-09-24 | Stop reason: HOSPADM

## 2023-09-24 RX ORDER — SODIUM CHLORIDE, SODIUM LACTATE, POTASSIUM CHLORIDE, CALCIUM CHLORIDE 600; 310; 30; 20 MG/100ML; MG/100ML; MG/100ML; MG/100ML
INJECTION, SOLUTION INTRAVENOUS CONTINUOUS
Status: DISCONTINUED | OUTPATIENT
Start: 2023-09-24 | End: 2023-09-26 | Stop reason: HOSPADM

## 2023-09-24 RX ORDER — CARBOPROST TROMETHAMINE 250 UG/ML
250 INJECTION, SOLUTION INTRAMUSCULAR
Status: DISCONTINUED | OUTPATIENT
Start: 2023-09-24 | End: 2023-09-26 | Stop reason: HOSPADM

## 2023-09-24 RX ORDER — KETOROLAC TROMETHAMINE 30 MG/ML
30 INJECTION, SOLUTION INTRAMUSCULAR; INTRAVENOUS
Status: DISCONTINUED | OUTPATIENT
Start: 2023-09-24 | End: 2023-09-25

## 2023-09-24 RX ORDER — CITRIC ACID/SODIUM CITRATE 334-500MG
30 SOLUTION, ORAL ORAL
Status: DISCONTINUED | OUTPATIENT
Start: 2023-09-24 | End: 2023-09-24 | Stop reason: HOSPADM

## 2023-09-24 RX ORDER — LIDOCAINE 40 MG/G
CREAM TOPICAL
Status: DISCONTINUED | OUTPATIENT
Start: 2023-09-24 | End: 2023-09-24 | Stop reason: HOSPADM

## 2023-09-24 RX ORDER — NALBUPHINE HYDROCHLORIDE 20 MG/ML
2.5-5 INJECTION, SOLUTION INTRAMUSCULAR; INTRAVENOUS; SUBCUTANEOUS EVERY 6 HOURS PRN
Status: DISCONTINUED | OUTPATIENT
Start: 2023-09-24 | End: 2023-09-26 | Stop reason: HOSPADM

## 2023-09-24 RX ORDER — PROCHLORPERAZINE MALEATE 5 MG
10 TABLET ORAL EVERY 6 HOURS PRN
Status: DISCONTINUED | OUTPATIENT
Start: 2023-09-24 | End: 2023-09-24 | Stop reason: HOSPADM

## 2023-09-24 RX ORDER — ACETAMINOPHEN 325 MG/1
650 TABLET ORAL EVERY 4 HOURS PRN
Status: DISCONTINUED | OUTPATIENT
Start: 2023-09-24 | End: 2023-09-26 | Stop reason: HOSPADM

## 2023-09-24 RX ORDER — MISOPROSTOL 200 UG/1
800 TABLET ORAL
Status: DISCONTINUED | OUTPATIENT
Start: 2023-09-24 | End: 2023-09-26 | Stop reason: HOSPADM

## 2023-09-24 RX ORDER — OXYTOCIN 10 [USP'U]/ML
10 INJECTION, SOLUTION INTRAMUSCULAR; INTRAVENOUS
Status: DISCONTINUED | OUTPATIENT
Start: 2023-09-24 | End: 2023-09-24 | Stop reason: HOSPADM

## 2023-09-24 RX ORDER — METHYLERGONOVINE MALEATE 0.2 MG/ML
200 INJECTION INTRAVENOUS
Status: DISCONTINUED | OUTPATIENT
Start: 2023-09-24 | End: 2023-09-24 | Stop reason: HOSPADM

## 2023-09-24 RX ORDER — METOCLOPRAMIDE 10 MG/1
10 TABLET ORAL EVERY 6 HOURS PRN
Status: DISCONTINUED | OUTPATIENT
Start: 2023-09-24 | End: 2023-09-24 | Stop reason: HOSPADM

## 2023-09-24 RX ORDER — OXYTOCIN/0.9 % SODIUM CHLORIDE 30/500 ML
100-340 PLASTIC BAG, INJECTION (ML) INTRAVENOUS CONTINUOUS PRN
Status: DISCONTINUED | OUTPATIENT
Start: 2023-09-24 | End: 2023-09-26 | Stop reason: HOSPADM

## 2023-09-24 RX ORDER — MISOPROSTOL 200 UG/1
400 TABLET ORAL
Status: DISCONTINUED | OUTPATIENT
Start: 2023-09-24 | End: 2023-09-24 | Stop reason: HOSPADM

## 2023-09-24 RX ORDER — ONDANSETRON 2 MG/ML
4 INJECTION INTRAMUSCULAR; INTRAVENOUS EVERY 6 HOURS PRN
Status: DISCONTINUED | OUTPATIENT
Start: 2023-09-24 | End: 2023-09-24 | Stop reason: HOSPADM

## 2023-09-24 RX ORDER — METHYLERGONOVINE MALEATE 0.2 MG/ML
200 INJECTION INTRAVENOUS
Status: DISCONTINUED | OUTPATIENT
Start: 2023-09-24 | End: 2023-09-26 | Stop reason: HOSPADM

## 2023-09-24 RX ORDER — MODIFIED LANOLIN
OINTMENT (GRAM) TOPICAL
Status: DISCONTINUED | OUTPATIENT
Start: 2023-09-24 | End: 2023-09-26 | Stop reason: HOSPADM

## 2023-09-24 RX ORDER — CARBOPROST TROMETHAMINE 250 UG/ML
250 INJECTION, SOLUTION INTRAMUSCULAR
Status: DISCONTINUED | OUTPATIENT
Start: 2023-09-24 | End: 2023-09-24 | Stop reason: HOSPADM

## 2023-09-24 RX ORDER — OXYTOCIN 10 [USP'U]/ML
10 INJECTION, SOLUTION INTRAMUSCULAR; INTRAVENOUS
Status: DISCONTINUED | OUTPATIENT
Start: 2023-09-24 | End: 2023-09-26 | Stop reason: HOSPADM

## 2023-09-24 RX ORDER — IBUPROFEN 400 MG/1
800 TABLET, FILM COATED ORAL
Status: DISCONTINUED | OUTPATIENT
Start: 2023-09-24 | End: 2023-09-25

## 2023-09-24 RX ORDER — PROCHLORPERAZINE 25 MG
25 SUPPOSITORY, RECTAL RECTAL EVERY 12 HOURS PRN
Status: DISCONTINUED | OUTPATIENT
Start: 2023-09-24 | End: 2023-09-24 | Stop reason: HOSPADM

## 2023-09-24 RX ORDER — TRANEXAMIC ACID 10 MG/ML
1 INJECTION, SOLUTION INTRAVENOUS EVERY 30 MIN PRN
Status: DISCONTINUED | OUTPATIENT
Start: 2023-09-24 | End: 2023-09-24 | Stop reason: HOSPADM

## 2023-09-24 RX ORDER — ROPIVACAINE HYDROCHLORIDE 2 MG/ML
10 INJECTION, SOLUTION EPIDURAL; INFILTRATION; PERINEURAL ONCE
Status: DISCONTINUED | OUTPATIENT
Start: 2023-09-24 | End: 2023-09-24 | Stop reason: HOSPADM

## 2023-09-24 RX ORDER — IBUPROFEN 400 MG/1
800 TABLET, FILM COATED ORAL EVERY 6 HOURS PRN
Status: DISCONTINUED | OUTPATIENT
Start: 2023-09-24 | End: 2023-09-26 | Stop reason: HOSPADM

## 2023-09-24 RX ORDER — ONDANSETRON 4 MG/1
4 TABLET, ORALLY DISINTEGRATING ORAL EVERY 6 HOURS PRN
Status: DISCONTINUED | OUTPATIENT
Start: 2023-09-24 | End: 2023-09-24 | Stop reason: HOSPADM

## 2023-09-24 RX ORDER — ROPIVACAINE HYDROCHLORIDE 2 MG/ML
INJECTION, SOLUTION EPIDURAL; INFILTRATION; PERINEURAL
Status: COMPLETED | OUTPATIENT
Start: 2023-09-24 | End: 2023-09-24

## 2023-09-24 RX ORDER — EPHEDRINE SULFATE 50 MG/ML
5 INJECTION, SOLUTION INTRAMUSCULAR; INTRAVENOUS; SUBCUTANEOUS
Status: DISCONTINUED | OUTPATIENT
Start: 2023-09-24 | End: 2023-09-24 | Stop reason: HOSPADM

## 2023-09-24 RX ORDER — TRANEXAMIC ACID 10 MG/ML
1 INJECTION, SOLUTION INTRAVENOUS EVERY 30 MIN PRN
Status: DISCONTINUED | OUTPATIENT
Start: 2023-09-24 | End: 2023-09-26 | Stop reason: HOSPADM

## 2023-09-24 RX ORDER — OXYTOCIN/0.9 % SODIUM CHLORIDE 30/500 ML
340 PLASTIC BAG, INJECTION (ML) INTRAVENOUS CONTINUOUS PRN
Status: DISCONTINUED | OUTPATIENT
Start: 2023-09-24 | End: 2023-09-24 | Stop reason: HOSPADM

## 2023-09-24 RX ORDER — MISOPROSTOL 200 UG/1
400 TABLET ORAL
Status: DISCONTINUED | OUTPATIENT
Start: 2023-09-24 | End: 2023-09-26 | Stop reason: HOSPADM

## 2023-09-24 RX ORDER — DOCUSATE SODIUM 100 MG/1
100 CAPSULE, LIQUID FILLED ORAL DAILY
Status: DISCONTINUED | OUTPATIENT
Start: 2023-09-25 | End: 2023-09-26 | Stop reason: HOSPADM

## 2023-09-24 RX ORDER — BISACODYL 10 MG
10 SUPPOSITORY, RECTAL RECTAL DAILY PRN
Status: DISCONTINUED | OUTPATIENT
Start: 2023-09-24 | End: 2023-09-26 | Stop reason: HOSPADM

## 2023-09-24 RX ORDER — METOCLOPRAMIDE HYDROCHLORIDE 5 MG/ML
10 INJECTION INTRAMUSCULAR; INTRAVENOUS EVERY 6 HOURS PRN
Status: DISCONTINUED | OUTPATIENT
Start: 2023-09-24 | End: 2023-09-24 | Stop reason: HOSPADM

## 2023-09-24 RX ORDER — MISOPROSTOL 200 UG/1
800 TABLET ORAL
Status: DISCONTINUED | OUTPATIENT
Start: 2023-09-24 | End: 2023-09-24 | Stop reason: HOSPADM

## 2023-09-24 RX ORDER — OXYTOCIN/0.9 % SODIUM CHLORIDE 30/500 ML
340 PLASTIC BAG, INJECTION (ML) INTRAVENOUS CONTINUOUS PRN
Status: DISCONTINUED | OUTPATIENT
Start: 2023-09-24 | End: 2023-09-26 | Stop reason: HOSPADM

## 2023-09-24 RX ADMIN — Medication: at 19:34

## 2023-09-24 RX ADMIN — ROPIVACAINE HYDROCHLORIDE 10 ML: 2 INJECTION, SOLUTION EPIDURAL; INFILTRATION at 19:29

## 2023-09-24 RX ADMIN — Medication 340 ML/HR: at 22:48

## 2023-09-24 RX ADMIN — EPHEDRINE SULFATE 5 MG: 5 INJECTION INTRAVENOUS at 19:39

## 2023-09-24 RX ADMIN — SODIUM CHLORIDE, POTASSIUM CHLORIDE, SODIUM LACTATE AND CALCIUM CHLORIDE 1000 ML: 600; 310; 30; 20 INJECTION, SOLUTION INTRAVENOUS at 18:14

## 2023-09-24 ASSESSMENT — ACTIVITIES OF DAILY LIVING (ADL)
ADLS_ACUITY_SCORE: 18

## 2023-09-24 NOTE — PROGRESS NOTES
Pt arrived to [facility] for evaluation of labor. Pt denies any rupture of membranes but did have bloody show prior to coming to the hospital. EUM/US monitors applied fetal heart tones 120's. VS within normal limits. VE 4cm 80 -3 intact. Dr. Arita paged.

## 2023-09-25 LAB — T PALLIDUM AB SER QL: NONREACTIVE

## 2023-09-25 PROCEDURE — 250N000013 HC RX MED GY IP 250 OP 250 PS 637: Performed by: OBSTETRICS & GYNECOLOGY

## 2023-09-25 PROCEDURE — 120N000012 HC R&B POSTPARTUM

## 2023-09-25 RX ADMIN — ACETAMINOPHEN 650 MG: 325 TABLET, FILM COATED ORAL at 01:12

## 2023-09-25 RX ADMIN — IBUPROFEN 800 MG: 400 TABLET ORAL at 14:24

## 2023-09-25 RX ADMIN — ACETAMINOPHEN 650 MG: 325 TABLET, FILM COATED ORAL at 20:45

## 2023-09-25 RX ADMIN — IBUPROFEN 800 MG: 400 TABLET ORAL at 01:12

## 2023-09-25 RX ADMIN — ACETAMINOPHEN 650 MG: 325 TABLET, FILM COATED ORAL at 14:23

## 2023-09-25 RX ADMIN — ACETAMINOPHEN 650 MG: 325 TABLET, FILM COATED ORAL at 07:19

## 2023-09-25 RX ADMIN — IBUPROFEN 800 MG: 400 TABLET ORAL at 21:24

## 2023-09-25 RX ADMIN — IBUPROFEN 800 MG: 400 TABLET ORAL at 07:20

## 2023-09-25 RX ADMIN — DOCUSATE SODIUM 100 MG: 100 CAPSULE, LIQUID FILLED ORAL at 20:45

## 2023-09-25 ASSESSMENT — ACTIVITIES OF DAILY LIVING (ADL)
ADLS_ACUITY_SCORE: 18

## 2023-09-25 NOTE — PLAN OF CARE
Pt delivered viable male infant at 2243. Maternal VSS. Fundus firm U/1; scant bleeding noted. Breastfeeding infant independently.

## 2023-09-25 NOTE — PROVIDER NOTIFICATION
09/24/23 2022   Provider Notification   Provider Name/Title Dr. Arita   Method of Notification Electronic Page   Request Evaluate - Remote   Notification Reason SVE;Status Update     FYI; SVE 7/85/-3; bulging bag

## 2023-09-25 NOTE — H&P
[facility] Labor and Delivery History and Physical    Aisha Brown MRN# [id number]   Age: 33 year old YOB: 1990     Date of Admission:  2023    Primary care provider: Maribel Shields           Chief Complaint:   Aisha Brown is a 33 year old female who is 40w6d pregnant and being admitted for active labor management.          Pregnancy history:     Pregnancy uncomplicated. Declined early genetic screening options. Normal routine anatomy scan. Expecting baby boy.   G1 delivery was VAVD due to NRFHTs after 2 hours of pushing, complicated by 3rd deg laceration and PPH - patient nervous about this history.     OBSTETRIC HISTORY:    OB History    Para Term  AB Living   2 1 1 0 0 1   SAB IAB Ectopic Multiple Live Births   0 0 0 0 1      # Outcome Date GA Lbr Louis/2nd Weight Sex Delivery Anes PTL Lv   2 Current            1 Term 22 40w5d 08:15 / 02:21 3.64 kg (8 lb 0.4 oz) F  EPI N SEMAJ      Name: RAMON BROWN-AISHA      Apgar1: 8  Apgar5: 9       EDC: Estimated Date of Delivery: 23    Prenatal Labs:   Lab Results   Component Value Date    AS Negative 2023    CHPCRT  2012     Negative for C. trachomatis rRNA by transcription mediated amplification.   A negative result by transcription mediated amplification does not preclude the   presence of C. trachomatis infection because results are dependent on proper   and adequate collection, absence of inhibitors, and sufficient rRNA to be   detected.   A negative urine result for a female patient who is clinically suspected of   having a chlamydial infection does not rule out the presence of C. trachomatis   in the urogenital tract.    GCPCRT  2012     Negative for N. gonorrhoeae rRNA by transcription mediated amplification.   A negative result by transcription mediated amplification does not preclude the   presence of N. gonorrhoeae infection because results are dependent on  proper   and adequate collection, absence of inhibitors, and sufficient rRNA to be   detected.   A negative urine result for a female patient who is clinically suspect of   having a gonococcal infection does not rule out the presence of N. gonorrhoeae   in the urogenital tract.    HGB 14.8 09/24/2023       GBS Status:   Lab Results   Component Value Date    GBS Negative 08/22/2023       Active Problem List  Patient Active Problem List   Diagnosis    iamJOINT DERANGMENT NEC-SHLDER    Chronic constipation    IBS (irritable bowel syndrome)    Cervicalgia    Gluten intolerance    Lactose intolerance    CARDIOVASCULAR SCREENING; LDL GOAL LESS THAN 130    Labor complication    Encounter for triage in pregnant patient    Indication for care in labor or delivery    Vacuum-assisted vaginal delivery    Third degree perineal laceration during delivery with less than 50% tear of external anal sphincter    Indication for care or intervention in labor or delivery       Medication Prior to Admission  Medications Prior to Admission   Medication Sig Dispense Refill Last Dose    acetaminophen (TYLENOL) 325 MG tablet Take 2 tablets (650 mg) by mouth every 4 hours as needed for mild pain or fever 60 tablet 0 Unknown    ibuprofen (ADVIL/MOTRIN) 800 MG tablet Take 1 tablet (800 mg) by mouth every 8 hours as needed for other (cramping) 30 tablet 0 Unknown    Prenatal Vit-Fe Fumarate-FA (PNV PRENATAL PLUS MULTIVITAMIN) 27-1 MG TABS per tablet Take 1 tablet by mouth daily   9/23/2023    Probiotic Product (PROBIOTIC PO)    Unknown    SENNA-docusate sodium (SENNA S) 8.6-50 MG tablet Take 1 tablet by mouth At Bedtime 60 tablet 0 Unknown   .        Maternal Past Medical History:   History reviewed. No pertinent past medical history.                    Family History:   This patient has no significant family history            Social History:   This patient has no significant social history         Review of Systems:   The Review of Systems is  negative other than noted in the HPI          Physical Exam:   Vitals were reviewed  Temp: 97.4  F (36.3  C) Temp src: Temporal BP: 98/60 Pulse: 85   Resp: 16 SpO2: 100 % O2 Device: None (Room air)    Constitutional:   awake, alert, cooperative, no apparent distress, and appears stated age     Cardiovascular:   Normal apical impulse, regular rate and rhythm, normal S1 and S2, no S3 or S4, and no murmur noted     Abdomen:   Gravid       Cervix:   Membranes: intact   Dilation: 4   Effacement: 90%   Station:-2   Consistency: soft   Position: Mid  Presentation:Cephalic  Fetal Heart Rate Tracing: reactive and reassuring  Tocometer: external monitor                       Assessment:   Ernestina Brown is a   at 40w6d by early ob dating here in labor          Plan:   Admitted at 4cm, now 7cm and comfortable with epidural. With patient consent, AROM'd for clear fluid. Anticipate .   GBS neg    Ladi Arita MD

## 2023-09-25 NOTE — ANESTHESIA PREPROCEDURE EVALUATION
Anesthesia Pre-Procedure Evaluation    Patient: Ernestina Brown   MRN: [id number] : 1990        Procedure :           History reviewed. No pertinent past medical history.   Past Surgical History:   Procedure Laterality Date    ENDOSCOPY        No Known Allergies   Social History     Tobacco Use    Smoking status: Never    Smokeless tobacco: Never   Substance Use Topics    Alcohol use: Not Currently     Comment: not during pregnancy      Wt Readings from Last 1 Encounters:   23 66.7 kg (147 lb)        Anesthesia Evaluation            ROS/MED HX  ENT/Pulmonary:    (-) asthma   Neurologic:  - neg neurologic ROS     Cardiovascular:    (-) PIH   METS/Exercise Tolerance:     Hematologic:     (+)  no anticoagulation therapy, no coagulopathy,             Musculoskeletal:       GI/Hepatic:       Renal/Genitourinary:       Endo:       Psychiatric/Substance Use:       Infectious Disease:       Malignancy:       Other:            Physical Exam    Airway        Mallampati: II   TM distance: > 3 FB   Neck ROM: full     Respiratory Devices and Support         Dental  no notable dental history         Cardiovascular   cardiovascular exam normal          Pulmonary   pulmonary exam normal                OUTSIDE LABS:  CBC:   Lab Results   Component Value Date    WBC 6.1 2015    WBC 5.1 2014    HGB 14.8 2023    HGB 11.0 (L) 2022    HCT 41.9 2015    HCT 41.8 2014     2015     2014     BMP:   Lab Results   Component Value Date     2014     2011    POTASSIUM 4.4 2014    POTASSIUM 3.9 2011    CHLORIDE 107 2014    CHLORIDE 103 2011    CO2 32 2014    CO2 27 2011    BUN 7 2014    BUN 7 2011    CR 0.73 2014    CR 0.81 2011    GLC 80 2014    GLC 74 2011     COAGS: No results found for: PTT, INR, FIBR  POC: No results found for: BGM, HCG, HCGS  HEPATIC:   Lab Results    Component Value Date    ALBUMIN 3.9 11/26/2014    PROTTOTAL 6.9 11/26/2014    ALT 18 11/26/2014    AST 22 11/26/2014    ALKPHOS 112 11/26/2014    BILITOTAL 0.9 11/26/2014     OTHER:   Lab Results   Component Value Date    ANUP 8.6 11/26/2014    TSH 1.57 09/11/2015       Anesthesia Plan    ASA Status:  2       Anesthesia Type: Epidural.              Consents    Anesthesia Plan(s) and associated risks, benefits, and realistic alternatives discussed. Questions answered and patient/representative(s) expressed understanding.     - Discussed:     - Discussed with:  Patient            Postoperative Care            Comments:    Other Comments: Orders to manage the epidural infusion have been entered, and through coordination with the nurse, we will continute to manage and monitor the patient's labor epidural.  We will continuously be available to adjust as needed thruout the entire L&D process.             Del Mayberry MD

## 2023-09-25 NOTE — PROVIDER NOTIFICATION
09/24/23 1927   Provider Notification   Provider Name/Title Dr. Arita   Method of Notification Electronic Page   Request Evaluate - Remote   Notification Reason Labor Status     Update: Pt 4-5/80/-3. getting epidural. Category 1 FHT. Thanks!

## 2023-09-25 NOTE — PLAN OF CARE
Vitally stable. Fundus firm. Scant flow. Breastfeeding well. Bonding well with baby. Supportive spouse at bedside.

## 2023-09-25 NOTE — PROGRESS NOTES
Data: Ernestina Brown transferred to 435 via wheelchair at 130. Baby transferred via parent's arms.  Action: Receiving unit notified of transfer: Yes. Patient and family notified of room change. Report given to Susan SARMIENTO RN at 130. Belongings sent to receiving unit. Accompanied by Registered Nurse. Oriented patient to surroundings. Call light within reach. ID bands double-checked with receiving RN.  Response: Patient tolerated transfer and is stable.

## 2023-09-25 NOTE — ANESTHESIA POSTPROCEDURE EVALUATION
Patient: Ernestina Brown    Procedure: * No procedures listed *       Anesthesia Type:  Epidural    Note:  Disposition: Outpatient   Postop Pain Control: Uneventful            Sign Out: Well controlled pain   PONV: No   Neuro/Psych: Uneventful            Sign Out: Acceptable/Baseline neuro status   Airway/Respiratory: Uneventful            Sign Out: Acceptable/Baseline resp. status   CV/Hemodynamics: Uneventful            Sign Out: Acceptable CV status; No obvious hypovolemia; No obvious fluid overload   Other NRE: NONE   DID A NON-ROUTINE EVENT OCCUR? No    Event details/Postop Comments:  The patient denies numbness, weakness, or tingling in either of the lower extremities; denies positional headache; and denies significant back pain. The patient was then counseled on any potential concerning symptoms and if/when to reach out for further guidance. They expressed understanding of the instructions and felt comfortable with the plan.           Last vitals:  Vitals:    09/25/23 1200 09/25/23 1609 09/25/23 1635   BP: 113/70 102/64 109/70   Pulse: 80 82 69   Resp: 16 16 16   Temp: 36.7  C (98  F) 36.8  C (98.2  F) 36.8  C (98.2  F)   SpO2:          Electronically Signed By: Harshad Ram MD  September 25, 2023  5:37 PM

## 2023-09-25 NOTE — L&D DELIVERY NOTE
OB Vaginal Delivery Note    Ernestina Brown MRN# [id number]   Age: 33 year old YOB: 1990       GA: 40w6d  GP:   Labor Complications: None   EBL: 200  mL  Delivery QBL:    Delivery Type: Vaginal, Spontaneous   ROM to Delivery Time: (Delivered) Hours: 1 Minutes: 47   Weight:   pending    1 Minute 5 Minute 10 Minute   Apgar Totals: 8   9             Delivery Details:  Ernestina Brown, a 33 year old  female delivered a viable infant with apgars of 8  and 9 . Patient was admitted in active labor. Delivery was via vaginal, spontaneous  to a sterile field under epidural  anesthesia. Infant delivered in vertex  left  occiput  anterior  position. Anterior and posterior shoulders delivered without difficulty. The cord was clamped, cut twice and 3 vessels  were noted. Cord blood was obtained in routine fashion with the following disposition: lab .      Cord complications: none   Placenta delivered at  . Placental disposition was Hospital disposal . Fundal massage performed and fundus found to be firm.     Episiotomy: none    Perineum, vagina, cervix were inspected, and the following lacerations were noted:   Perineal lacerations: 1st                Any lacerations were repaired in the usual fashion using 3-0 vicryl suture.    Excellent hemostasis was noted. Needle count correct. Infant and patient in delivery room in good and stable condition.        Stephanie Jesus-Ernestina [[id number]]      Labor Event Times      Active labor onset date: 23 Onset time:  8:00 PM CDT   Dilation complete date: 23 Complete time: 10:05 PM   Start pushing date/time: 2023          Labor Events     labor?: No   steroids: None  Labor Type: Spontaneous  Predominate monitoring during 1st stage: continuous electronic fetal monitoring, intermittent auscultation       Rupture date/time: 23   Rupture type: Artificial Rupture of Membranes  Fluid color: Clear  Fluid odor: Normal      Augmentation: AROM  Indications for augmentation: Ineffective Contraction Pattern       Delivery/Placenta Date and Time      Delivery Date: 23 Delivery Time: 10:43 PM                 Apgars    Living status: Living   1 Minute 5 Minute 10 Minute 15 Minute 20 Minute   Skin color: 0  1       Heart rate: 2  2       Reflex irritability: 2  2       Muscle tone: 2  2       Respiratory effort: 2  2       Total: 8  9       Apgars assigned by: TORSTEN CARRASCO RN       Cord      Vessels: 3 Vessels    Cord Complications: None               Cord Blood Disposition: Lab    Gases Sent?: No    Delayed cord clamping?: Yes    Cord Clamping Delay (seconds):  seconds    Stem cell collection?: No            Resuscitation    Methods: None   Care at Delivery: Viable male infant born vaginally and placed on maternal abdomen. Dried/stimulated and bulb suction used in mouth and bilateral nares for clear secretions. Vigorous cry resulted. Delayed cord clamping performed for approximately 1-2 minutes. Cord clamped and cut and infant moved to maternal chest where he will remain for transition as of this time. TORSTEN Carrasco RN 23 @ 2300  Output in Delivery Room: Voided       [place] Measurements    Output in delivery room: Voided       Skin to Skin and Feeding Plan      Skin to skin initiation date/time: 1841    Skin to skin with: Mother  Skin to skin end date/time:            Labor Events and Shoulder Dystocia    Fetal Tracing Prior to Delivery: Category 1  Shoulder dystocia present?: Neg       Delivery (Maternal) (Provider to Complete) ([id number])    Episiotomy: None  Perineal lacerations: 1st Repaired?: Yes   Est. blood loss (mL): 200  Repair suture: 3-0 Vicryl  Number of repair packets: 1  Genital tract inspection done: Pos       Blood Loss  Mother: Noreen Brownmagdiel SHANKAR #[id number]     Start of Mother's Information      Delivery Blood Loss  23 - 23 2310      EBL (mL) Hospital Encounter 200 mL    Total   200 mL               End of Mother's Information  Mother: Ernestina Brown #[id number]                Delivery - Provider to Complete ([id number])    Delivery Type (Choose the 1 that will go to the Birth History): Vaginal, Spontaneous                                           Placenta    Removal: Spontaneous  Disposition: Hospital disposal             Anesthesia    Method: Epidural  Cervical dilation at placement: 4-7                    Presentation and Position    Presentation: Vertex    Position: Left Occiput Anterior                     Ladi Arita MD

## 2023-09-25 NOTE — PROGRESS NOTES
"Data: Patient admitted to room 212 at 1615. Patient is a . Prenatal record reviewed.   OB History    Para Term  AB Living   2 1 1 0 0 1   SAB IAB Ectopic Multiple Live Births   0 0 0 0 1      # Outcome Date GA Lbr Louis/2nd Weight Sex Delivery Anes PTL Lv   2 Current            1 Term 22 40w5d 08:15 / 02:21 3.64 kg (8 lb 0.4 oz) F  EPI N SEMAJ      Name: JUDITHFEMALE-AISHA      Apgar1: 8  Apgar5: 9   .  Medical History: PT states that she does not have any complications with this pregnancy. Last pregnancy had vacuum delivery d/t \"baby's heart rate being low\", a third degree tear and postpartum hemorrhage.    Gestational age 40w6d. Vital signs per doc flowsheet. Fetal movement present. Patient reports Laboring   as reason for admission.  Maciel present.  Action: Report from PAULINE Sen obtained at 1615. Care of patient assumed at 1615. Verbal consent for EFM, external fetal monitors applied. Admission assessment completed. Patient and support persons educated on labor process. Patient instructed to report change in fetal movement, contractions, vaginal leaking of fluid or bleeding, abdominal pain, or any concerns related to the pregnancy to her nurse/physician. Patient oriented to room, call light in reach.   Response: Dr. Arita informed of PT labor status by previous RN. Plan per provider intermittent auscultation, continue to monitor PT labor progress, notify MD when PT gets epidural. Patient verbalized understanding of education and verbalized agreement with plan. Patient coping with labor via distraction and breathing techniques.        "

## 2023-09-25 NOTE — PROGRESS NOTES
OB Vaginal Delivery Progress Note    Patient name: Ernestina Brown  : 1990  Admit date: 2023  MRN: [id number]  Acct: [id number]      Assessment/Plan:  Ernestina Brown is a  who is PPD#1 from .    HD stable. Meeting postpartum goals.   VMI - Feeding by Breast  Blood type: B POS, no need for Rhogam  Rubella immune  Cont routine PP care. Anticipate d/c home tomorrow due to late hour of delivery      Subjective:  The patient did well overnight. There were no acute issues. Her pain is well controlled. She notes appropriate lochia. Remarks cramping is more after this delivery. She is tolerating a regular diet well without nausea or vomiting. She has ambulated. She is voiding without difficulty. She denies dizziness, lightheadedness, headache, vision changes, chest pain, shortness of breath, RUQ pain, calf pain, or other complaints on ROS. Breastfeeding is going well - just weaned 20mo about a month ago.     Objective:  Vitals:  Temp:  [97.1  F (36.2  C)-99  F (37.2  C)] 98  F (36.7  C)  Pulse:  [63-85] (P) 85  Resp:  [16-18] 16  BP: ()/(50-81) 111/70  SpO2:  [98 %-100 %] 98 %    Exam:  General: no acute distress  Cardiovascular: pulses intact, trace peripheral edema  Pulmonary: no respiratory difficulty, normal non-labored breathing  Abdomen: soft, appropriatly tender to palpation, non-distended  Uterus: fundus firm at U-2  Extremities: BL lower extremities non-tender, no palpable cords  Psych: mood appropriate    Rosalee Vu MD  She/Her  [phone]  23 12:56 PM    Text Page (8am - 5pm)

## 2023-09-25 NOTE — ANESTHESIA PROCEDURE NOTES
"Epidural catheter Procedure Note    Pre-Procedure   Staff -        Anesthesiologist:  Del Mayberry MD       Performed By: anesthesiologist       Location: OB       Pre-Anesthestic Checklist: patient identified, IV checked, risks and benefits discussed, informed consent, monitors and equipment checked, pre-op evaluation and at physician/surgeon's request  Timeout:       Correct Patient: Yes        Correct Procedure: Yes        Correct Site: Yes        Correct Position: Yes   Procedure Documentation  Procedure: epidural catheter       Patient Position: sitting       Patient Prep/Sterile Barriers: sterile gloves, mask, patient draped       Skin prep: Betadine       Local skin infiltrated with 3 mL of 1% lidocaine.        Insertion Site: L3-4. (midline approach).       Technique: LORT saline        SULTANA at 5 cm.       Needle Type: [employer] needle       Needle Gauge: 17.        Needle Length (Inches): 3.5        Catheter: 19 G.          Catheter threaded easily.         4 cm epidural space.         Threaded 9 cm at skin.         # of attempts: 1 and  # of redirects:          : 0.    Assessment/Narrative         Paresthesias: No.       Test dose of 3 mL lidocaine 1.5% w/ 1:200,000 epinephrine at.         Test dose negative, 3 minutes after injection, for signs of intravascular, subdural, or intrathecal injection.       Insertion/Infusion Method: LORT saline       Aspiration negative for Heme or CSF via Epidural Catheter.    Medication(s) Administered   0.2% Ropivacaine (Epidural) - EPIDURAL   10 mL - 9/24/2023 7:29:00 PM   Comments:  Pt tolerated well. No complications.   Catheter taped sterile and securely with sterile medical adhesive spray and tegaderm.   Pt placed back in supine with ALLAN.   FHTs stable post-procedure.        FOR [facility] ([facility]) ONLY:   Pain Team Contact information: please page the Pain Team Via [email or web address]. Search \"Pain\". During daytime hours, please page the attending first. At night please " page the resident first.

## 2023-09-26 VITALS
DIASTOLIC BLOOD PRESSURE: 68 MMHG | BODY MASS INDEX: 24.57 KG/M2 | HEIGHT: 63 IN | HEART RATE: 79 BPM | OXYGEN SATURATION: 98 % | RESPIRATION RATE: 17 BRPM | TEMPERATURE: 98 F | WEIGHT: 138.67 LBS | SYSTOLIC BLOOD PRESSURE: 107 MMHG

## 2023-09-26 PROCEDURE — 250N000013 HC RX MED GY IP 250 OP 250 PS 637: Performed by: OBSTETRICS & GYNECOLOGY

## 2023-09-26 RX ADMIN — IBUPROFEN 800 MG: 400 TABLET ORAL at 02:38

## 2023-09-26 RX ADMIN — ACETAMINOPHEN 650 MG: 325 TABLET, FILM COATED ORAL at 08:20

## 2023-09-26 RX ADMIN — ACETAMINOPHEN 650 MG: 325 TABLET, FILM COATED ORAL at 02:38

## 2023-09-26 RX ADMIN — IBUPROFEN 800 MG: 400 TABLET ORAL at 08:22

## 2023-09-26 RX ADMIN — DOCUSATE SODIUM 100 MG: 100 CAPSULE, LIQUID FILLED ORAL at 08:22

## 2023-09-26 ASSESSMENT — ACTIVITIES OF DAILY LIVING (ADL)
ADLS_ACUITY_SCORE: 18

## 2023-09-26 NOTE — DISCHARGE INSTRUCTIONS
Warning Signs after Having a Baby    Keep this paper on your fridge or somewhere else where you can see it.    Call your provider if you have any of these symptoms up to 12 weeks after having your baby.    Thoughts of hurting yourself or your baby  Pain in your chest or trouble breathing  Severe headache not helped by pain medicine  Eyesight concerns (blurry vision, seeing spots or flashes of light, other changes to eyesight)  Fainting, shaking or other signs of a seizure    Call 9-1-1 if you feel that it is an emergency.     The symptoms below can happen to anyone after giving birth. They can be very serious. Call your provider if you have any of these warning signs.    My provider s phone number: _______________________    Losing too much blood (hemorrhage)    Call your provider if you soak through a pad in less than an hour or pass blood clots bigger than a golf ball. These may be signs that you are bleeding too much.    Blood clots in the legs or lungs    After you give birth, your body naturally clots its blood to help prevent blood loss. Sometimes this increased clotting can happen in other areas of the body, like the legs or lungs. This can block your blood flow and be very dangerous.     Call your provider if you:  Have a red, swollen spot on the back of your leg that is warm or painful when you touch it.   Are coughing up blood.     Infection    Call your provider if you have any of these symptoms:  Fever of 100.4 F (38 C) or higher.  Pain or redness around your stitches if you had an incision.   Any yellow, white, or green fluid coming from places where you had stitches or surgery.    Mood Problems (postpartum depression)    Many people feel sad or have mood changes after having a baby. But for some people, these mood swings are worse.     Call your provider right away if you feel so anxious or nervous that you can't care for yourself or your baby.    Preeclampsia (high blood pressure)    Even if you  didn't have high blood pressure when you were pregnant, you are at risk for the high blood pressure disease called preeclampsia. This risk can last up to 12 weeks after giving birth.     Call your provider if you have:   Pain on your right side under your rib cage  Sudden swelling in the hands and face    Remember: You know your body. If something doesn't feel right, get medical help.     For informational purposes only. Not to replace the advice of your health care provider. Copyright 2020 [facility]. All rights reserved. Clinically reviewed by Lisa Brody, RNC-OB, MSN. [employer] [id number] - Rev 02/23.

## 2023-09-26 NOTE — PROGRESS NOTES
OB Vaginal Delivery Progress Note    Patient name: Ernestina Brown  : 1990  Admit date: 2023  MRN: [id number]  Acct: [id number]      Assessment/Plan:  Ernestina Brown is a  who is PPD#2 from .    1. HD stable. Meeting postpartum goals.   2. VMI - Feeding by Breast  3. Blood type: B POS, no need for Rhogam  4. Rubella immune    Dispo: stable for discharge to home, f/u for 6wk PPV      Subjective:  The patient did well overnight. There were no acute issues. Her pain is well controlled. She notes appropriate lochia. Remarks cramping is more after this delivery. She is tolerating a regular diet well without nausea or vomiting. She has ambulated. She is voiding without difficulty. She denies dizziness, lightheadedness, headache, vision changes, chest pain, shortness of breath, RUQ pain, calf pain, or other complaints on ROS. Breastfeeding is going well - just weaned 20mo about a month ago.     Objective:  Vitals:  Temp:  [97.4  F (36.3  C)-98.2  F (36.8  C)] 98  F (36.7  C)  Pulse:  [69-85] 79  Resp:  [16-17] 17  BP: (101-113)/(64-70) 107/68    Exam:  General: no acute distress  Cardiovascular: pulses intact, trace peripheral edema  Pulmonary: no respiratory difficulty, normal non-labored breathing  Abdomen: soft, appropriatly tender to palpation, non-distended  Uterus: fundus firm at U-2  Extremities: BL lower extremities non-tender, no palpable cords  Psych: mood appropriate    Juhi Limon MD  23 10:59 AM

## 2023-09-26 NOTE — PLAN OF CARE
Goal Outcome Evaluation:      Plan of Care Reviewed With: patient, spouse    Overall Patient Progress: improvingOverall Patient Progress: improving         D: VSS, assessments WDL.   I: Pt. received complete discharge paperwork and home medications as filled pharmacy OTC.  Pt. was given times of last dose for all discharge medications in writing on discharge medication sheets.  Discharge teaching included home medication, pain management, activity restrictions, postpartum cares, and signs and symptoms of infection.    A: Discharge outcomes on care plan met.  Mother states understanding and comfort with self care and follow up care.   P: Pt. Discharged.  Pt. was accompanied by  and left with personal belongings.    Pt. to follow up with OB provider per discharge instructions.  Pt. had no further questions at the time of discharge and no unmet needs were identified.

## 2023-09-26 NOTE — PLAN OF CARE
Goal Outcome Evaluation:      Plan of Care Reviewed With: patient, spouse      Vital signs stable. Postpartum assessment WDL. Pain controlled with Tylenol and Ibuprofen. Patient voiding without difficulty. Breastfeeding on cue with minimal  assist. Patient and infant bonding well. Will continue with current plan of care.

## 2023-09-26 NOTE — PLAN OF CARE
Shift note  Vital signs and assessments stable throughout the shift. Patient up independently in the room, pain well controlled with ibuprofen and tylenol. Patient doing all baby cares and breastfeeding independently. Would like to be discharged today.

## 2023-10-29 ENCOUNTER — HEALTH MAINTENANCE LETTER (OUTPATIENT)
Age: 33
End: 2023-10-29

## 2024-12-21 ENCOUNTER — HEALTH MAINTENANCE LETTER (OUTPATIENT)
Age: 34
End: 2024-12-21